# Patient Record
Sex: FEMALE | Race: WHITE | Employment: OTHER | URBAN - METROPOLITAN AREA
[De-identification: names, ages, dates, MRNs, and addresses within clinical notes are randomized per-mention and may not be internally consistent; named-entity substitution may affect disease eponyms.]

---

## 2017-11-21 ENCOUNTER — HOSPITAL ENCOUNTER (OUTPATIENT)
Dept: GENERAL RADIOLOGY | Age: 82
Discharge: HOME | End: 2017-11-21
Attending: INTERNAL MEDICINE
Payer: MEDICARE

## 2017-11-21 DIAGNOSIS — M25.552 LEFT HIP PAIN: ICD-10-CM

## 2017-11-21 PROCEDURE — 73502 X-RAY EXAM HIP UNI 2-3 VIEWS: CPT

## 2018-03-06 ENCOUNTER — OFFICE VISIT (OUTPATIENT)
Dept: FAMILY MEDICINE CLINIC | Age: 83
End: 2018-03-06

## 2018-03-06 VITALS
DIASTOLIC BLOOD PRESSURE: 89 MMHG | RESPIRATION RATE: 18 BRPM | WEIGHT: 171.6 LBS | HEART RATE: 70 BPM | OXYGEN SATURATION: 95 % | BODY MASS INDEX: 30.41 KG/M2 | TEMPERATURE: 98.6 F | HEIGHT: 63 IN | SYSTOLIC BLOOD PRESSURE: 158 MMHG

## 2018-03-06 DIAGNOSIS — E03.4 HYPOTHYROIDISM DUE TO ACQUIRED ATROPHY OF THYROID: ICD-10-CM

## 2018-03-06 DIAGNOSIS — R73.03 PRE-DIABETES: ICD-10-CM

## 2018-03-06 DIAGNOSIS — Z00.00 INITIAL MEDICARE ANNUAL WELLNESS VISIT: Primary | ICD-10-CM

## 2018-03-06 DIAGNOSIS — I10 ESSENTIAL HYPERTENSION: ICD-10-CM

## 2018-03-06 RX ORDER — AMLODIPINE AND BENAZEPRIL HYDROCHLORIDE 5; 20 MG/1; MG/1
1 CAPSULE ORAL DAILY
Qty: 90 CAP | Refills: 1 | Status: SHIPPED | OUTPATIENT
Start: 2018-03-06 | End: 2018-08-30 | Stop reason: SDUPTHER

## 2018-03-06 NOTE — PROGRESS NOTES
This is an Initial Medicare Annual Wellness Exam (AWV) (Performed 12 months after IPPE or effective date of Medicare Part B enrollment, Once in a lifetime)    I have reviewed the patient's medical history in detail and updated the computerized patient record. History     Past Medical History:   Diagnosis Date    Allergic rhinitis 3/27/2012    Angular blepharoconjunctivitis of both eyes 5/30/2012    Anxiety     Atypical chest pain 8/11/2011    Back pain     Cancer (HCC)     skin carcinoma excision    Constipation     Dermatochalasis     DJD (degenerative joint disease) 8/11/2011    Glucose intolerance (impaired glucose tolerance) 8/11/2011    Hepatitis     HTN (hypertension) 8/11/2011    HX OTHER MEDICAL 2009    Cardiac cath negative , in [place]    Hypothyroidism 8/11/2011    IBS (irritable bowel syndrome) 8/11/2011    Muscle pain     Onychomycosis     Osteopenia     PAC (premature atrial contraction)     Rectocele     Stress     Trouble in sleeping     Viral hepatitis     Weakness     Zoster       Past Surgical History:   Procedure Laterality Date    BREAST SURGERY PROCEDURE UNLISTED      rt breast bx neg.  ENDOSCOPY, COLON, DIAGNOSTIC  2010    diverticulosis, dr. Susan Solis, f/u     HX APPENDECTOMY      HX COLONOSCOPY      HX GYN      D and C, BTL, Hysterectomy('76)    HX OTHER SURGICAL      tonsilectomy    HX OTHER SURGICAL      eyelid sgy     HX TONSILLECTOMY      VASCULAR SURGERY PROCEDURE UNLIST      vein stripping     Current Outpatient Prescriptions   Medication Sig Dispense Refill    amLODIPine-benazepril (LOTREL) 5-10 mg per capsule TAKE 1 CAPSULE BY MOUTH DAILY 90 Cap 3    ALPRAZolam (XANAX) 0.5 mg tablet Take  by mouth nightly as needed for Anxiety.  calcium carbonate-vitamin D3 (CALCIUM 600 WITH VITAMIN D3) 600 mg(1,500mg) -500 unit cap Take  by mouth daily.  loratadine (CLARITIN) 10 mg tablet Take 10 mg by mouth.       levothyroxine (SYNTHROID) 125 mcg tablet TAKE 1 TABLET EVERY DAY 90 Tab 3    OTHER Glucosamine + MSM 1500 mg  , 2 daily. Psyllium Fiber  3 gm : 2 tabs daily      ibuprofen (MOTRIN) 200 mg tablet Take 200 mg by mouth every six (6) hours as needed.  OTHER Glucosamine Chondroitin 3tablets daily      ascorbic acid (VITAMIN C) 500 mg tablet Take  by mouth.  MAG HYDROX/AL HYDROX/SIMETH (ANTACID-SIMETHICONE PO) Take  by mouth.  fluocinolone acetonide oil 0.01 % Drop by Otic route.  OMEGA-3 FATTY ACIDS (FISH OIL CONCENTRATE PO) Take  by mouth.  aspirin delayed-release 81 mg tablet Take  by mouth daily.  MAGNESIUM OXIDE (MAG-OXIDE PO) Take 400 mg by mouth daily.  polyethylene glycol (MIRALAX) 17 gram/dose powder Take 17 g by mouth daily as needed.        Allergies   Allergen Reactions    Augmentin [Amoxicillin-Pot Clavulanate] Nausea and Vomiting    Bactrim [Sulfamethoprim Ds] Other (comments)     Palpitations, diarrhea    Ciprofloxacin Unknown (comments)    Codeine Unknown (comments)    Hydrocodone Unknown (comments)    Levaquin [Levofloxacin] Rash    Melatonin Other (comments)     nightmares    Morphine Unknown (comments)    Pataday [Olopatadine] Other (comments)     Eye itching      Family History   Problem Relation Age of Onset    Diabetes Father     Heart Attack Father     Hypertension Father     Stroke Mother     Heart Attack Mother      CABG    Hypertension Mother     Breast Cancer Sister     Diabetes Sister     Hypertension Sister     No Known Problems Brother     No Known Problems Maternal Grandmother     No Known Problems Maternal Grandfather     No Known Problems Paternal Grandmother     No Known Problems Paternal Grandfather     No Known Problems Other     Cancer Son      bladder cancer    Diabetes Son     Diabetes Sister     Hypertension Sister     Diabetes Sister     Hypertension Sister      Social History   Substance Use Topics    Smoking status: Never Smoker    Smokeless tobacco: Never Used    Alcohol use 2.4 oz/week     4 Glasses of wine per week     Patient Active Problem List   Diagnosis Code    Glucose intolerance (impaired glucose tolerance) R73.02    HTN (hypertension) I10    Atypical chest pain R07.89    IBS (irritable bowel syndrome) K58.9    Hypothyroidism E03.9    DJD (degenerative joint disease) M19.90    Allergic rhinitis J30.9    Angular blepharoconjunctivitis of both eyes H10.523    Rectocele N81.6       Depression Risk Factor Screening:     PHQ over the last two weeks 3/6/2018   Little interest or pleasure in doing things Not at all   Feeling down, depressed or hopeless Not at all   Total Score PHQ 2 0     Alcohol Risk Factor Screening: You do not drink alcohol or very rarely. Functional Ability and Level of Safety:     Hearing Loss  The patient wears hearing aids. Activities of Daily Living  The home contains: handrails and grab bars  Patient does total self care    Fall Risk  Fall Risk Assessment, last 12 mths 3/6/2018   Able to walk? Yes   Fall in past 12 months? Yes   Fall with injury? No   Number of falls in past 12 months 1   Fall Risk Score 1       Abuse Screen  Patient is not abused  Functional Ability:   Does the patient exhibit a steady gait? yes   How long did it take the patient to get up and walk from a sitting position? 3 seconds   Is the patient self reliant?  (ie can do own laundry, meals, household chores)  yes     Does the patient handle his/her own medications? yes     Does the patient handle his/her own money? yes     Is the patients home safe (ie good lighting, handrails on stairs and bath, etc.)? yes     Did you notice or did patient express any hearing difficulties? no     Did you notice or did patient express any vision difficulties?   no     Were distance and reading eye charts used?   no       Advance Care Planning:   Patient was offered the opportunity to discuss advance care planning:  yes     Does patient have an Advance Directive:  yes   If no, did you provide information on Caring Connections?   no     ADL Assessment 3/6/2018   Feeding yourself No Help Needed   Getting from bed to chair No Help Needed   Getting dressed No Help Needed   Bathing or showering No Help Needed   Walk across the room (includes cane/walker) No Help Needed   Using the telphone No Help Needed   Taking your medications No Help Needed   Preparing meals No Help Needed   Managing money (expenses/bills) No Help Needed   Moderately strenuous housework (laundry) No Help Needed   Shopping for personal items (toiletries/medicines) No Help Needed   Shopping for groceries No Help Needed   Driving No Help Needed   Climbing a flight of stairs No Help Needed   Getting to places beyond walking distances No Help Needed         Cognitive Screening   Evaluation of Cognitive Function:  Has your family/caregiver stated any concerns about your memory: no  Normal    Patient Care Team   Patient Care Team:  Graciella Carrel, MD as PCP - General (Family Practice)    Assessment/Plan   Education and counseling provided:  Are appropriate based on today's review and evaluation  End-of-Life planning (with patient's consent)  Pneumococcal Vaccine  Influenza Vaccine  Screening for glaucoma         Health Maintenance Due   Topic Date Due    GLAUCOMA SCREENING Q2Y  02/07/2016

## 2018-03-07 NOTE — ACP (ADVANCE CARE PLANNING)
Advance Care Planning (ACP) Provider Conversation Snapshot    Date of ACP Conversation: 03/06/18  Persons included in Conversation:  patient  Length of ACP Conversation in minutes:  <16 minutes (Non-Billable)    Authorized Decision Maker (if patient is incapable of making informed decisions): This person is:    Other Legally Authorized Decision Maker (e.g. Next of Kin)      Conversation Outcomes / Follow-Up Plan:   Recommended communicating the plan and making copies for the healthcare agent, personal physician, and others as appropriate (e.g., health system)  Recommended review of completed ACP document annually or upon change in health status

## 2018-03-07 NOTE — PROGRESS NOTES
HISTORY OF PRESENT ILLNESS  Zeina Pal is a 80 y.o. female. HPI  She is here as a new patient to become established. She and her  have moved to the area from [place]. They have been here several months and are getting acclimated to the area. She has a history of HTN and is on Lotrel. BPsat  home have been in the 130-150 range. She has not been exercising very much this winter. Tries to watch her diet and avoids sodium. Hypothyroid on Synthroid. .    Chronic allergies treated with Claritin. Last A1c was 6.2%    She takes a number of supplements including calcium, vitamin d and c, magnesium, fish oil, and glucosamine. Review of Systems   Constitutional: Positive for malaise/fatigue. Negative for chills and fever. HENT: Positive for congestion. Negative for sore throat. Respiratory: Negative for cough, shortness of breath and wheezing. Cardiovascular: Positive for leg swelling. Negative for chest pain and palpitations. Gastrointestinal: Negative for abdominal pain. Genitourinary: Negative for dysuria. Musculoskeletal: Positive for joint pain. Negative for myalgias. Skin: Positive for rash. Neurological: Negative for dizziness and headaches. Psychiatric/Behavioral: Negative for depression. The patient is not nervous/anxious.       Past Medical History:   Diagnosis Date    Allergic rhinitis 3/27/2012    Angular blepharoconjunctivitis of both eyes 5/30/2012    Anxiety     Atypical chest pain 8/11/2011    Back pain     Cancer ([facility] [address]     skin carcinoma excision    Constipation     Dermatochalasis     DJD (degenerative joint disease) 8/11/2011    Glucose intolerance (impaired glucose tolerance) 8/11/2011    Hepatitis     HTN (hypertension) 8/11/2011    HX OTHER MEDICAL 2009    Cardiac cath negative , in [place]    Hypothyroidism 8/11/2011    IBS (irritable bowel syndrome) 8/11/2011    Onychomycosis     Osteopenia     PAC (premature atrial contraction)     Rectocele     Stress     Viral hepatitis     Weakness     Zoster      Past Surgical History:   Procedure Laterality Date    BREAST SURGERY PROCEDURE UNLISTED      rt breast bx neg.  ENDOSCOPY, COLON, DIAGNOSTIC  2010    diverticulosis, dr. Lisa Bryant, f/u     HX APPENDECTOMY      HX COLONOSCOPY      HX GYN      D and C, BTL, Hysterectomy('76)    HX OTHER SURGICAL      tonsilectomy    HX OTHER SURGICAL      eyelid sgy     HX TONSILLECTOMY      VASCULAR SURGERY PROCEDURE UNLIST      vein stripping     Allergies   Allergen Reactions    Augmentin [Amoxicillin-Pot Clavulanate] Nausea and Vomiting    Bactrim [Sulfamethoprim Ds] Other (comments)     Palpitations, diarrhea    Ciprofloxacin Unknown (comments)    Codeine Unknown (comments)    Hydrocodone Unknown (comments)    Levaquin [Levofloxacin] Rash    Melatonin Other (comments)     nightmares    Morphine Unknown (comments)    Pataday [Olopatadine] Other (comments)     Eye itching      Blood pressure 158/89, pulse 70, temperature 98.6 °F (37 °C), temperature source Oral, resp. rate 18, height 5' 3\" (1.6 m), weight 171 lb 9.6 oz (77.8 kg), last menstrual period 05/27/1976, SpO2 95 %. Physical Exam   Constitutional: She appears well-developed and well-nourished. No distress. HENT:   Head: Normocephalic. Nose: Nose normal.   Mouth/Throat: Oropharynx is clear and moist.   Eyes: Conjunctivae are normal.   Neck: Neck supple. Cardiovascular: Normal rate, regular rhythm and normal heart sounds. Pulmonary/Chest: Effort normal and breath sounds normal. No respiratory distress. Lymphadenopathy:     She has no cervical adenopathy. Neurological: She is alert. Skin: Skin is warm. Psychiatric: She has a normal mood and affect. Vitals reviewed.       ASSESSMENT and PLAN  HTN/BMI 30   Increase Lotrel to 5/20mh daily   RTO 2 months for BP check and labs   Monitor BP at home and call if no improvement   Discussed diet, exercise, and weight loss  Hypothyroid   Continue Synthroid  Allergies   Continue Claritin  Pre diabetes   Last A1c was 6.2   Discussed diet and exercise   Advised 70% of pre diabetes develop DM

## 2018-05-07 ENCOUNTER — OFFICE VISIT (OUTPATIENT)
Dept: FAMILY MEDICINE CLINIC | Age: 83
End: 2018-05-07

## 2018-05-07 VITALS
WEIGHT: 171 LBS | HEART RATE: 72 BPM | BODY MASS INDEX: 30.3 KG/M2 | SYSTOLIC BLOOD PRESSURE: 164 MMHG | HEIGHT: 63 IN | RESPIRATION RATE: 18 BRPM | DIASTOLIC BLOOD PRESSURE: 89 MMHG | OXYGEN SATURATION: 97 % | TEMPERATURE: 98.5 F

## 2018-05-07 DIAGNOSIS — R73.03 PRE-DIABETES: ICD-10-CM

## 2018-05-07 DIAGNOSIS — I10 ESSENTIAL HYPERTENSION: ICD-10-CM

## 2018-05-07 DIAGNOSIS — R53.83 FATIGUE, UNSPECIFIED TYPE: ICD-10-CM

## 2018-05-07 DIAGNOSIS — E03.9 ACQUIRED HYPOTHYROIDISM: ICD-10-CM

## 2018-05-07 DIAGNOSIS — R60.9 EDEMA, UNSPECIFIED TYPE: Primary | ICD-10-CM

## 2018-05-07 NOTE — PROGRESS NOTES
1. Have you been to the ER, urgent care clinic since your last visit? Hospitalized since your last visit? No    2. Have you seen or consulted any other health care providers outside of the [address] [place] [address] since your last visit? Include any pap smears or colon screening.  No

## 2018-05-07 NOTE — PROGRESS NOTES
HISTORY OF PRESENT ILLNESS  Simon De Paz is a 80 y.o. female. HPI  She is here with swelling of her lower extremities. R>L. She had a previous injury to the right ankle requiring surgery. She is watching her sodium, exercising more regularly, and watching her diet. She is more fatigued. Wakes up tired. Having a hard time losing weight. Snores. BP has been elevated recently. Watching her sodium. [facility]. FH of CAD. Blood sugar has been mildly elevated. FH of DM    Review of Systems   Constitutional: Positive for malaise/fatigue. Negative for chills, fever and weight loss. HENT: Negative for congestion and sore throat. Respiratory: Negative for cough. Cardiovascular: Positive for leg swelling. Negative for chest pain and palpitations. Gastrointestinal: Negative for abdominal pain and nausea. Genitourinary: Negative for dysuria. Musculoskeletal: Positive for back pain, joint pain and myalgias. Neurological: Negative for dizziness and headaches. Psychiatric/Behavioral: Negative for depression. The patient does not have insomnia. Past Medical History:   Diagnosis Date    Allergic rhinitis 3/27/2012    Angular blepharoconjunctivitis of both eyes 5/30/2012    Anxiety     Atypical chest pain 8/11/2011    Back pain     Cancer (HCC)     skin carcinoma excision    Constipation     Dermatochalasis     DJD (degenerative joint disease) 8/11/2011    Glucose intolerance (impaired glucose tolerance) 8/11/2011    Hepatitis     HTN (hypertension) 8/11/2011    HX OTHER MEDICAL 2009    Cardiac cath negative , in [place]    Hypothyroidism 8/11/2011    IBS (irritable bowel syndrome) 8/11/2011    Onychomycosis     Osteopenia     PAC (premature atrial contraction)     Rectocele     Stress     Viral hepatitis     Weakness     Zoster      Past Surgical History:   Procedure Laterality Date    BREAST SURGERY PROCEDURE UNLISTED      rt breast bx neg.     ENDOSCOPY, COLON, DIAGNOSTIC  2010    diverticulosis, dr. Fidelia Valle, f/u     HX APPENDECTOMY      HX COLONOSCOPY      HX GYN      D and C, BTL, Hysterectomy('76)    HX OTHER SURGICAL      tonsilectomy    HX OTHER SURGICAL      eyelid sgy     HX TONSILLECTOMY      VASCULAR SURGERY PROCEDURE UNLIST      vein stripping       Allergies   Allergen Reactions    Augmentin [Amoxicillin-Pot Clavulanate] Nausea and Vomiting    Bactrim [Sulfamethoprim Ds] Other (comments)     Palpitations, diarrhea    Ciprofloxacin Unknown (comments)    Codeine Unknown (comments)    Hydrocodone Unknown (comments)    Levaquin [Levofloxacin] Rash    Melatonin Other (comments)     nightmares    Morphine Unknown (comments)    Pataday [Olopatadine] Other (comments)     Eye itching      Blood pressure 164/89, pulse 72, temperature 98.5 °F (36.9 °C), temperature source Oral, resp. rate 18, height 5' 3\" (1.6 m), weight 171 lb (77.6 kg), last menstrual period 05/27/1976, SpO2 97 %. Physical Exam   Constitutional: She appears well-developed and well-nourished. No distress. HENT:   Head: Normocephalic. Mouth/Throat: Oropharynx is clear and moist.   Neck: Neck supple. Cardiovascular: Normal rate, regular rhythm and normal heart sounds. Pulmonary/Chest: Effort normal and breath sounds normal. No respiratory distress. Musculoskeletal:   RLE edema. No calf swelling or cords. No redness. Lymphadenopathy:     She has no cervical adenopathy. Vitals reviewed.       ASSESSMENT and PLAN  Edema   Get doppler study today to r/o DVT- this was reported as neg  Fatigue    Check labs   Schedule home sleep study  HTN   Low sodium diet   Monitor BP  BMI 30   Discussed diet and exercise   Monitor weight  Hypothyroidism   Check TSH and adjust meds if needed  Pre diabetes   Discussed diet and exercise

## 2018-05-08 LAB
ALBUMIN SERPL-MCNC: 4 G/DL (ref 3.5–4.7)
ALBUMIN/GLOB SERPL: 1.9 {RATIO} (ref 1.2–2.2)
ALP SERPL-CCNC: 60 IU/L (ref 39–117)
ALT SERPL-CCNC: 18 IU/L (ref 0–32)
AST SERPL-CCNC: 22 IU/L (ref 0–40)
BASOPHILS # BLD AUTO: 0 X10E3/UL (ref 0–0.2)
BASOPHILS NFR BLD AUTO: 1 %
BILIRUB SERPL-MCNC: 0.3 MG/DL (ref 0–1.2)
BUN SERPL-MCNC: 13 MG/DL (ref 8–27)
BUN/CREAT SERPL: 21 (ref 12–28)
CALCIUM SERPL-MCNC: 9.2 MG/DL (ref 8.7–10.3)
CHLORIDE SERPL-SCNC: 95 MMOL/L (ref 96–106)
CO2 SERPL-SCNC: 26 MMOL/L (ref 18–29)
CREAT SERPL-MCNC: 0.63 MG/DL (ref 0.57–1)
EOSINOPHIL # BLD AUTO: 0.2 X10E3/UL (ref 0–0.4)
EOSINOPHIL NFR BLD AUTO: 3 %
ERYTHROCYTE [DISTWIDTH] IN BLOOD BY AUTOMATED COUNT: 13.6 % (ref 12.3–15.4)
GFR SERPLBLD CREATININE-BSD FMLA CKD-EPI: 83 ML/MIN/1.73
GFR SERPLBLD CREATININE-BSD FMLA CKD-EPI: 95 ML/MIN/1.73
GLOBULIN SER CALC-MCNC: 2.1 G/DL (ref 1.5–4.5)
GLUCOSE SERPL-MCNC: 153 MG/DL (ref 65–99)
HCT VFR BLD AUTO: 42 % (ref 34–46.6)
HGB BLD-MCNC: 14.2 G/DL (ref 11.1–15.9)
IMM GRANULOCYTES # BLD: 0 X10E3/UL (ref 0–0.1)
IMM GRANULOCYTES NFR BLD: 0 %
LYMPHOCYTES # BLD AUTO: 1.7 X10E3/UL (ref 0.7–3.1)
LYMPHOCYTES NFR BLD AUTO: 27 %
MCH RBC QN AUTO: 29.1 PG (ref 26.6–33)
MCHC RBC AUTO-ENTMCNC: 33.8 G/DL (ref 31.5–35.7)
MCV RBC AUTO: 86 FL (ref 79–97)
MONOCYTES # BLD AUTO: 0.7 X10E3/UL (ref 0.1–0.9)
MONOCYTES NFR BLD AUTO: 11 %
NEUTROPHILS # BLD AUTO: 3.7 X10E3/UL (ref 1.4–7)
NEUTROPHILS NFR BLD AUTO: 58 %
PLATELET # BLD AUTO: 242 X10E3/UL (ref 150–379)
POTASSIUM SERPL-SCNC: 3.6 MMOL/L (ref 3.5–5.2)
PROT SERPL-MCNC: 6.1 G/DL (ref 6–8.5)
RBC # BLD AUTO: 4.88 X10E6/UL (ref 3.77–5.28)
SODIUM SERPL-SCNC: 138 MMOL/L (ref 134–144)
TSH SERPL DL<=0.005 MIU/L-ACNC: 2.31 UIU/ML (ref 0.45–4.5)
WBC # BLD AUTO: 6.4 X10E3/UL (ref 3.4–10.8)

## 2018-05-08 NOTE — PROGRESS NOTES
Spoke with patient. Patient aware of results and states understanding at this time. Called lab sapna and added A1C to labs.

## 2018-05-09 LAB
HBA1C MFR BLD: 6.5 % (ref 4.8–5.6)
SPECIMEN STATUS REPORT, ROLRST: NORMAL

## 2018-05-10 NOTE — PROGRESS NOTES
Spoke with patient. Patient aware of results and states understanding at this time. Patient is scheduling an appt at this time for the next few weeks.

## 2018-05-22 ENCOUNTER — OFFICE VISIT (OUTPATIENT)
Dept: FAMILY MEDICINE CLINIC | Age: 83
End: 2018-05-22

## 2018-05-22 VITALS
WEIGHT: 171.13 LBS | DIASTOLIC BLOOD PRESSURE: 90 MMHG | SYSTOLIC BLOOD PRESSURE: 138 MMHG | BODY MASS INDEX: 30.32 KG/M2 | OXYGEN SATURATION: 97 % | HEART RATE: 102 BPM | HEIGHT: 63 IN | RESPIRATION RATE: 18 BRPM

## 2018-05-22 DIAGNOSIS — I10 ESSENTIAL HYPERTENSION: ICD-10-CM

## 2018-05-22 DIAGNOSIS — I48.0 PAROXYSMAL ATRIAL FIBRILLATION (HCC): ICD-10-CM

## 2018-05-22 DIAGNOSIS — R00.0 RAPID OR IRREGULAR HEARTBEAT: Primary | ICD-10-CM

## 2018-05-22 DIAGNOSIS — E03.9 ACQUIRED HYPOTHYROIDISM: ICD-10-CM

## 2018-05-22 NOTE — MR AVS SNAPSHOT
[address] [phone] Patient: Simon De Paz MRN: [id number] [facility]:0/50/[birth year]48 Visit Information Date & Time Provider Department Dept. Phone Encounter #  
 5/22/2018  3:00 PM Julian Guzmán  [address] [facility] [phone] [id number] Your Appointments 8/24/2018  2:30 PM  
ESTABLISHED PATIENT with MD Juliann Adams [address], [address] [place] ([facility]) Appt Note: physical; physical  
 [address] [facility] [phone]  
  
   
 [address] [facility] 7 [id number] Upcoming Health Maintenance Date Due  
 GLAUCOMA SCREENING Q2Y 2/7/2016 Influenza Age 5 to Adult 8/1/2018 MEDICARE YEARLY EXAM 3/7/2019 DTaP/Tdap/Td series (2 - Td) 3/6/2028 Allergies as of 5/22/2018  Review Complete On: 5/22/2018 By: Julian Guzmán MD  
  
 Severity Noted Reaction Type Reactions Augmentin [Amoxicillin-pot Clavulanate]  12/20/2011    Nausea and Vomiting Bactrim [Sulfamethoprim Ds]  03/04/2013    Other (comments) Palpitations, diarrhea Ciprofloxacin  01/16/2018    Unknown (comments) Codeine  08/11/2011    Unknown (comments) Hydrocodone  08/11/2011    Unknown (comments) Levaquin [Levofloxacin]  08/11/2011    Rash Melatonin  10/13/2011    Other (comments)  
 nightmares Morphine  08/11/2011    Unknown (comments) Pataday [Olopatadine]  03/27/2012    Other (comments) Eye itching Current Immunizations  Reviewed on 3/6/2018 Name Date Influenza High Dose Vaccine PF 10/30/2017 Influenza Vaccine 10/27/2014, 12/5/2013 Influenza Vaccine Split 11/29/2012 Pneumococcal Conjugate (PCV-13) 11/6/2017 Pneumococcal Vaccine (Unspecified Type) 11/11/1999 Td 3/5/2011, 4/7/1997 Not reviewed this visit You Were Diagnosed With   
  
 Codes Comments Rapid or irregular heartbeat    -  Primary ICD-10-CM: R00.0 ICD-9-CM: 785.0 Acquired hypothyroidism     ICD-10-CM: E03.9 ICD-9-CM: 244.9 Paroxysmal atrial fibrillation (HCC)     ICD-10-CM: I48.0 ICD-9-CM: 427.31 Essential hypertension     ICD-10-CM: I10 
ICD-9-CM: 401.9 Vitals BP Pulse Resp Height(growth percentile) Weight(growth percentile) LMP  
 138/90 (BP 1 Location: Left arm) (!) 102 18 5' 3\" (1.6 m) 171 lb 2 oz (77.6 kg) 05/27/1976 SpO2 BMI OB Status Smoking Status 97% 30.31 kg/m2 Hysterectomy Never Smoker Vitals History BMI and BSA Data Body Mass Index Body Surface Area  
 30.31 kg/m 2 1.86 m 2 Preferred Pharmacy Pharmacy Name Phone [id number], [address] [phone] Your Updated Medication List  
  
   
This list is accurate as of 5/22/18  4:17 PM.  Always use your most recent med list. amLODIPine-benazepril 5-20 mg per capsule Commonly known as:  Ellis Episcopalian Take 1 Cap by mouth daily. ANTACID-SIMETHICONE PO Take  by mouth. ascorbic acid (vitamin C) 500 mg tablet Commonly known as:  VITAMIN C Take  by mouth. aspirin delayed-release 81 mg tablet Take  by mouth daily. CALCIUM 600 WITH VITAMIN D3 600 mg(1,500mg) -500 unit Cap Generic drug:  calcium carbonate-vitamin D3 Take  by mouth daily. FISH OIL CONCENTRATE PO Take  by mouth. fluocinolone acetonide oil 0.01 % Drop  
by Otic route. levothyroxine 125 mcg tablet Commonly known as:  SYNTHROID  
TAKE 1 TABLET EVERY DAY  
  
 loratadine 10 mg tablet Commonly known as:  Cesario Found Take 10 mg by mouth. MAG-OXIDE PO Take 400 mg by mouth daily. MIRALAX 17 gram/dose powder Generic drug:  polyethylene glycol Take 17 g by mouth daily as needed. * OTHER Glucosamine Chondroitin 3tablets daily  * OTHER  
 Glucosamine + MSM 1500 mg  , 2 daily. Psyllium Fiber  3 gm : 2 tabs daily  
  
 rivaroxaban 20 mg Tab tablet Commonly known as:  Fidelia Quinones Take 1 Tab by mouth daily (with dinner). XANAX 0.5 mg tablet Generic drug:  ALPRAZolam  
Take  by mouth nightly as needed for Anxiety. * Notice: This list has 2 medication(s) that are the same as other medications prescribed for you. Read the directions carefully, and ask your doctor or other care provider to review them with you. Prescriptions Sent to Pharmacy Refills  
 rivaroxaban (XARELTO) 20 mg tab tablet 0 Sig: Take 1 Tab by mouth daily (with dinner). Class: Normal  
 Pharmacy: [id number] David Hurtado, [facility] [address] Ph #: [phone] Route: Oral  
  
We Performed the Following AMB POC EKG ROUTINE W/ 12 LEADS, INTER & REP [30211 CPT(R)] CBC WITH AUTOMATED DIFF [88320 CPT(R)] COLLECTION VENOUS BLOOD,VENIPUNCTURE [id number] CPT(R)] METABOLIC PANEL, COMPREHENSIVE [59121 CPT(R)] REFERRAL TO CARDIOLOGY [OQN73 Custom] TSH 3RD GENERATION [76639 CPT(R)] To-Do List   
 05/22/2018 ECHO:  2D ECHO COMPLETE ADULT (TTE) W OR WO CONTR   
  
 05/22/2018 ECG:  CARDIAC HOLTER MONITOR, 24 HOURS Referral Information Referral ID Referred By Referred To  
  
 [id number] Uvaldo COLBY MD   
   [address] [place] [address] Way Phone: [phone] Fax: [phone] Visits Status Start Date End Date 1 New Request 5/22/18 5/22/19 If your referral has a status of pending review or denied, additional information will be sent to support the outcome of this decision. Introducing [facility]! [employer] introduces [email or web address] patient portal. Now you can access parts of your medical record, email your doctor's office, and request medication refills online.    
 
1. In your internet browser, go to [email or web address] 2. Click on the First Time User? Click Here link in the Sign In box. You will see the New Member Sign Up page. 3. Enter your [employer] Access Code exactly as it appears below. You will not need to use this code after youve completed the sign-up process. If you do not sign up before the expiration date, you must request a new code. · [employer] Access Code: [email or web address] Expires: 8/5/2018  3:47 PM 
 
4. Enter the last four digits of your Social Security Number (xxxx) and Date of Birth (mm/dd/yyyy) as indicated and click Submit. You will be taken to the next sign-up page. 5. Create a [employer] ID. This will be your [employer] login ID and cannot be changed, so think of one that is secure and easy to remember. 6. Create a [employer] password. You can change your password at any time. 7. Enter your Password Reset Question and Answer. This can be used at a later time if you forget your password. 8. Enter your e-mail address. You will receive e-mail notification when new information is available in [address] 9. Click Sign Up. You can now view and download portions of your medical record. 10. Click the Download Summary menu link to download a portable copy of your medical information. If you have questions, please visit the Frequently Asked Questions section of the [employer] website. Remember, [employer] is NOT to be used for urgent needs. For medical emergencies, dial 911. Now available from your iPhone and Android! Please provide this summary of care documentation to your next provider. Your primary care clinician is listed as Rosa Maria Marcus. If you have any questions after today's visit, please call [phone].

## 2018-05-22 NOTE — PROGRESS NOTES
1. Have you been to the ER, urgent care clinic since your last visit? Hospitalized since your last visit? No    2. Have you seen or consulted any other health care providers outside of the [address] [place] [address] since your last visit? Include any pap smears or colon screening.  No

## 2018-05-22 NOTE — PROGRESS NOTES
HISTORY OF PRESENT ILLNESS  Karley Mae is a 80 y.o. female. HPI  She is here with c/o palpitations, edema and fatigue. Her  felt her pulse and found it was irregular. She denies CP, SOB, or dizziness. Review of Systems   Constitutional: Positive for malaise/fatigue. Negative for chills, fever and weight loss. HENT: Negative for congestion and sore throat. Respiratory: Negative for cough. Cardiovascular: Positive for palpitations and leg swelling. Negative for chest pain. Gastrointestinal: Negative for abdominal pain and nausea. Genitourinary: Negative for dysuria. Musculoskeletal: Positive for back pain, joint pain and myalgias. Negative for falls. Neurological: Negative for dizziness and headaches. Psychiatric/Behavioral: Negative for depression. The patient does not have insomnia. Past Medical History:   Diagnosis Date    Allergic rhinitis 3/27/2012    Angular blepharoconjunctivitis of both eyes 5/30/2012    Anxiety     Atypical chest pain 8/11/2011    Back pain     Cancer (HCC)     skin carcinoma excision    Constipation     Dermatochalasis     DJD (degenerative joint disease) 8/11/2011    Glucose intolerance (impaired glucose tolerance) 8/11/2011    Hepatitis     HTN (hypertension) 8/11/2011    HX OTHER MEDICAL 2009    Cardiac cath negative , in [place]    Hypothyroidism 8/11/2011    IBS (irritable bowel syndrome) 8/11/2011    Onychomycosis     Osteopenia     PAC (premature atrial contraction)     Rectocele     Stress     Viral hepatitis     Weakness     Zoster      Past Surgical History:   Procedure Laterality Date    BREAST SURGERY PROCEDURE UNLISTED      rt breast bx neg.     ENDOSCOPY, COLON, DIAGNOSTIC  2010    diverticulosis, dr. Joellen Osborne, f/u     HX APPENDECTOMY      HX COLONOSCOPY      HX GYN      D and C, BTL, Hysterectomy('76)    HX OTHER SURGICAL      tonsilectomy    HX OTHER SURGICAL      eyelid sgy     HX TONSILLECTOMY      VASCULAR SURGERY PROCEDURE UNLIST      vein stripping     Allergies   Allergen Reactions    Augmentin [Amoxicillin-Pot Clavulanate] Nausea and Vomiting    Bactrim [Sulfamethoprim Ds] Other (comments)     Palpitations, diarrhea    Ciprofloxacin Unknown (comments)    Codeine Unknown (comments)    Hydrocodone Unknown (comments)    Levaquin [Levofloxacin] Rash    Melatonin Other (comments)     nightmares    Morphine Unknown (comments)    Pataday [Olopatadine] Other (comments)     Eye itching      Blood pressure 138/90, pulse (!) 102, resp. rate 18, height 5' 3\" (1.6 m), weight 171 lb 2 oz (77.6 kg), last menstrual period 05/27/1976, SpO2 97 %. Physical Exam   Constitutional: She appears well-developed and well-nourished. No distress. HENT:   Head: Normocephalic. Mouth/Throat: Oropharynx is clear and moist.   Neck: Neck supple. Cardiovascular: Normal heart sounds. Irregular rate and rhythm   Pulmonary/Chest: Effort normal and breath sounds normal. No respiratory distress. Musculoskeletal:   RLE edema. No calf swelling or cords. No redness. Lymphadenopathy:     She has no cervical adenopathy. Vitals reviewed.     EKG: A-fib with occ ectopic beat, rate 111  ASSESSMENT and PLAN  New onset A-fib   Schedule 24 hour holter and ECHO   Start Xarelto 20mg daily    Schedule appt with Dr Cervantes Route labs   To ER if worsening symptoms, dizziness or lightheadedness, or CP  HTN   Continue meds   Low sodium diet  Hypothyroidism   Adjust meds if needed

## 2018-05-23 LAB
ALBUMIN SERPL-MCNC: 4.2 G/DL (ref 3.5–4.7)
ALBUMIN/GLOB SERPL: 2.2 {RATIO} (ref 1.2–2.2)
ALP SERPL-CCNC: 59 IU/L (ref 39–117)
ALT SERPL-CCNC: 18 IU/L (ref 0–32)
AST SERPL-CCNC: 21 IU/L (ref 0–40)
BASOPHILS # BLD AUTO: 0 X10E3/UL (ref 0–0.2)
BASOPHILS NFR BLD AUTO: 0 %
BILIRUB SERPL-MCNC: 0.4 MG/DL (ref 0–1.2)
BUN SERPL-MCNC: 15 MG/DL (ref 8–27)
BUN/CREAT SERPL: 19 (ref 12–28)
CALCIUM SERPL-MCNC: 9 MG/DL (ref 8.7–10.3)
CHLORIDE SERPL-SCNC: 100 MMOL/L (ref 96–106)
CO2 SERPL-SCNC: 27 MMOL/L (ref 18–29)
CREAT SERPL-MCNC: 0.81 MG/DL (ref 0.57–1)
EOSINOPHIL # BLD AUTO: 0.2 X10E3/UL (ref 0–0.4)
EOSINOPHIL NFR BLD AUTO: 3 %
ERYTHROCYTE [DISTWIDTH] IN BLOOD BY AUTOMATED COUNT: 13.3 % (ref 12.3–15.4)
GFR SERPLBLD CREATININE-BSD FMLA CKD-EPI: 67 ML/MIN/1.73
GFR SERPLBLD CREATININE-BSD FMLA CKD-EPI: 77 ML/MIN/1.73
GLOBULIN SER CALC-MCNC: 1.9 G/DL (ref 1.5–4.5)
GLUCOSE SERPL-MCNC: 135 MG/DL (ref 65–99)
HCT VFR BLD AUTO: 42 % (ref 34–46.6)
HGB BLD-MCNC: 14 G/DL (ref 11.1–15.9)
IMM GRANULOCYTES # BLD: 0 X10E3/UL (ref 0–0.1)
IMM GRANULOCYTES NFR BLD: 0 %
LYMPHOCYTES # BLD AUTO: 2.3 X10E3/UL (ref 0.7–3.1)
LYMPHOCYTES NFR BLD AUTO: 33 %
MCH RBC QN AUTO: 29.2 PG (ref 26.6–33)
MCHC RBC AUTO-ENTMCNC: 33.3 G/DL (ref 31.5–35.7)
MCV RBC AUTO: 88 FL (ref 79–97)
MONOCYTES # BLD AUTO: 0.6 X10E3/UL (ref 0.1–0.9)
MONOCYTES NFR BLD AUTO: 8 %
NEUTROPHILS # BLD AUTO: 3.7 X10E3/UL (ref 1.4–7)
NEUTROPHILS NFR BLD AUTO: 56 %
PLATELET # BLD AUTO: 263 X10E3/UL (ref 150–379)
POTASSIUM SERPL-SCNC: 3.7 MMOL/L (ref 3.5–5.2)
PROT SERPL-MCNC: 6.1 G/DL (ref 6–8.5)
RBC # BLD AUTO: 4.8 X10E6/UL (ref 3.77–5.28)
SODIUM SERPL-SCNC: 139 MMOL/L (ref 134–144)
TSH SERPL DL<=0.005 MIU/L-ACNC: 2.38 UIU/ML (ref 0.45–4.5)
WBC # BLD AUTO: 6.8 X10E3/UL (ref 3.4–10.8)

## 2018-05-25 ENCOUNTER — OFFICE VISIT (OUTPATIENT)
Dept: FAMILY MEDICINE CLINIC | Age: 83
End: 2018-05-25

## 2018-05-25 VITALS
SYSTOLIC BLOOD PRESSURE: 122 MMHG | RESPIRATION RATE: 18 BRPM | OXYGEN SATURATION: 97 % | DIASTOLIC BLOOD PRESSURE: 82 MMHG | WEIGHT: 168 LBS | TEMPERATURE: 98.6 F | HEIGHT: 63 IN | BODY MASS INDEX: 29.77 KG/M2 | HEART RATE: 100 BPM

## 2018-05-25 DIAGNOSIS — I48.19 PERSISTENT ATRIAL FIBRILLATION (HCC): Primary | ICD-10-CM

## 2018-05-25 DIAGNOSIS — F43.0 ANXIETY AS ACUTE REACTION TO EXCEPTIONAL STRESS: ICD-10-CM

## 2018-05-25 DIAGNOSIS — I49.9 IRREGULAR HEART BEAT: ICD-10-CM

## 2018-05-25 DIAGNOSIS — F41.1 ANXIETY AS ACUTE REACTION TO EXCEPTIONAL STRESS: ICD-10-CM

## 2018-05-25 RX ORDER — METOPROLOL SUCCINATE 50 MG/1
50 TABLET, EXTENDED RELEASE ORAL 2 TIMES DAILY
Qty: 60 TAB | Refills: 2 | Status: SHIPPED | OUTPATIENT
Start: 2018-05-25 | End: 2018-08-18 | Stop reason: SDUPTHER

## 2018-05-25 NOTE — MR AVS SNAPSHOT
[facility] 
 
 
 [address] [facility] [phone] Patient: Herbert Stiles MRN: [id number] Visit Information Date & Time Provider Department Dept. Phone Encounter #  
 5/25/2018 10:40 AM Geraldene Epley., MD  Andrew Osorio [id number] Your Appointments 5/29/2018  4:15 PM  
PROCEDURE with [facility] ([address]) Appt Note: holter per dr. Syeda Michaels $0cp [address] [facility] 67 [address] [phone]  
  
   
 [address] [address] 6/25/2018  3:20 PM  
New Patient with Sandie Thrasher MD  
[facility] [address]) Appt Note: ref f-w - fu holter results - echo being ordered [address] [facility] 67 [address] [phone]  
  
   
 [address] [address] 8/24/2018  2:30 PM  
ESTABLISHED PATIENT with MD hTomas LopezAureliano [facility] [address], [address] ([address] [place] [address]) Appt Note: physical; physical  
 [address] [facility] 7 [address]  
[phone]  
  
   
 [address] [facility] 7 [address] Upcoming Health Maintenance Date Due  
 GLAUCOMA SCREENING Q2Y 2/7/2016 Influenza Age 5 to Adult 8/1/2018 MEDICARE YEARLY EXAM 3/7/2019 DTaP/Tdap/Td series (2 - Td) 3/6/2028 Allergies as of 5/25/2018  Review Complete On: 5/25/2018 By: Geraldene Epley., MD  
  
 Severity Noted Reaction Type Reactions Augmentin [Amoxicillin-pot Clavulanate]  12/20/2011    Nausea and Vomiting Bactrim [Sulfamethoprim Ds]  03/04/2013    Other (comments) Palpitations, diarrhea Ciprofloxacin  01/16/2018    Unknown (comments) Codeine  08/11/2011    Unknown (comments) Hydrocodone  08/11/2011    Unknown (comments) Levaquin [Levofloxacin]  08/11/2011    Rash Melatonin  10/13/2011    Other (comments)  
 nightmares Morphine  08/11/2011    Unknown (comments) Pataday [Olopatadine]  03/27/2012    Other (comments) Eye itching Current Immunizations  Reviewed on 3/6/2018 Name Date Influenza High Dose Vaccine PF 10/30/2017 Influenza Vaccine 10/27/2014, 12/5/2013 Influenza Vaccine Split 11/29/2012 Pneumococcal Conjugate (PCV-13) 11/6/2017 Pneumococcal Vaccine (Unspecified Type) 11/11/1999 Td 3/5/2011, 4/7/1997 Not reviewed this visit You Were Diagnosed With   
  
 Codes Comments Irregular heart beat    -  Primary ICD-10-CM: I49.9 ICD-9-CM: 427.9 Vitals BP Pulse Temp Resp Height(growth percentile) Weight(growth percentile) 122/82 (BP 1 Location: Left arm, BP Patient Position: Sitting) 100 98.6 °F (37 °C) (Oral) 18 5' 3\" (1.6 m) 168 lb (76.2 kg) LMP SpO2 BMI OB Status Smoking Status 05/27/1976 97% 29.76 kg/m2 Hysterectomy Never Smoker Vitals History BMI and BSA Data Body Mass Index Body Surface Area  
 29.76 kg/m 2 1.84 m 2 Preferred Pharmacy Pharmacy Name Phone [id number] [address] [phone] Your Updated Medication List  
  
   
This list is accurate as of 5/25/18 12:00 PM.  Always use your most recent med list. amLODIPine-benazepril 5-20 mg per capsule Commonly known as:  Elmo Sill Take 1 Cap by mouth daily. ANTACID-SIMETHICONE PO Take  by mouth. ascorbic acid (vitamin C) 500 mg tablet Commonly known as:  VITAMIN C Take  by mouth. aspirin delayed-release 81 mg tablet Take  by mouth daily. CALCIUM 600 WITH VITAMIN D3 600 mg(1,500mg) -500 unit Cap Generic drug:  calcium carbonate-vitamin D3 Take  by mouth daily. FISH OIL CONCENTRATE PO Take  by mouth. fluocinolone acetonide oil 0.01 % Drop  
by Otic route. levothyroxine 125 mcg tablet Commonly known as:  SYNTHROID  
TAKE 1 TABLET EVERY DAY  
  
 loratadine 10 mg tablet Commonly known as:  Bernradino Bird Take 10 mg by mouth. MAG-OXIDE PO Take 400 mg by mouth daily. MIRALAX 17 gram/dose powder Generic drug:  polyethylene glycol Take 17 g by mouth daily as needed. * OTHER Glucosamine Chondroitin 3tablets daily * OTHER Glucosamine + MSM 1500 mg  , 2 daily. Psyllium Fiber  3 gm : 2 tabs daily  
  
 rivaroxaban 20 mg Tab tablet Commonly known as:  Jossy Benes Take 1 Tab by mouth daily (with dinner). XANAX 0.5 mg tablet Generic drug:  ALPRAZolam  
Take  by mouth nightly as needed for Anxiety. * Notice: This list has 2 medication(s) that are the same as other medications prescribed for you. Read the directions carefully, and ask your doctor or other care provider to review them with you. We Performed the Following AMB POC EKG ROUTINE W/ 12 LEADS, INTER & REP [09235 CPT(R)] To-Do List   
 05/31/2018 2:00 PM  
  Appointment with Aldo Bueno 2 at [place] [address] ([phone]) GENERAL INSTRUCTIONS - If you have a hand-written prescription for this exam, you must bring it with you on the day of your exam. - Bring all relevant prior images from facilities outside of [facility]. Failure to provide images may delay reading by Radiologist. - Children under the age of 15 may not be left unattended. - [address] [place] [address] patients must have an armband and be accompanied by a caregiver or family member. APPOINTMENT CANCELLATION - To reschedule or cancel an appointment, please contact the Scheduling Department at [phone]. Introducing \[facility]\"" & HEALTH SERVICES! [facility] introduces [employer] patient portal. Now you can access parts of your medical record, email your doctor's office, and request medication refills online.    
 
1. In your internet browser, go to [email or web address] 2. Click on the First Time User? Click Here link in the Sign In box. You will see the New Member Sign Up page. 3. Enter your Stem Access Code exactly as it appears below. You will not need to use this code after youve completed the sign-up process. If you do not sign up before the expiration date, you must request a new code. · Stem Access Code: [email or web address] Expires: 8/5/2018  3:47 PM 
 
4. Enter the last four digits of your Social Security Number (xxxx) and Date of Birth (mm/dd/yyyy) as indicated and click Submit. You will be taken to the next sign-up page. 5. Create a [employer] ID. This will be your Stem login ID and cannot be changed, so think of one that is secure and easy to remember. 6. Create a Stem password. You can change your password at any time. 7. Enter your Password Reset Question and Answer. This can be used at a later time if you forget your password. 8. Enter your e-mail address. You will receive e-mail notification when new information is available in [address] 9. Click Sign Up. You can now view and download portions of your medical record. 10. Click the Download Summary menu link to download a portable copy of your medical information. If you have questions, please visit the Frequently Asked Questions section of the Stem website. Remember, Stem is NOT to be used for urgent needs. For medical emergencies, dial 911. Now available from your iPhone and Android! Please provide this summary of care documentation to your next provider. Your primary care clinician is listed as Wilfredo Lorenzo. If you have any questions after today's visit, please call [phone].

## 2018-05-25 NOTE — PROGRESS NOTES
1. Have you been to the ER, urgent care clinic since your last visit? Hospitalized since your last visit? No    2. Have you seen or consulted any other health care providers outside of the [address] [place] [address] since your last visit? Include any pap smears or colon screening.  No

## 2018-05-25 NOTE — PROGRESS NOTES
Molly Camilo is a 80 y.o. female who presents with the following:  Chief Complaint   Patient presents with    Nausea     last night    Diarrhea     4 times    Irregular Heart Beat     Started xarelto on tuesday    Head Pain     behind left ear        Nausea    The history is provided by the patient (Patient has had nausea with diarrhea for several days without fevers chills or sweats nor blood in stool nor pus.). Associated symptoms include diarrhea. Pertinent negatives include no chills, no fever, no abdominal pain, no headaches, no myalgias, no cough and no headaches. Diarrhea    The history is provided by the patient (Patient has had diarrhea as noted above. ). Pertinent negatives include no abdominal pain, no chills, no headaches, no myalgias and no cough. Irregular Heart Beat    The history is provided by the patient (Patient has been extremely anxious over developing A. fib and being started on Xarelto. She now has tachycardia. ). Associated symptoms include nausea. Pertinent negatives include no fever, no malaise/fatigue, no chest pain, no claudication, no orthopnea, no abdominal pain, no headaches, no dizziness, no cough and no shortness of breath. Head Pain    The history is provided by the patient (The patient has had one episode that was very brief of pain in the left mastoid area that radiated down her sternocleidomastoid muscle and was enough to wake her up but went away and a few seconds and has not recurred. ). Associated symptoms include palpitations and nausea. Pertinent negatives include no fever, no malaise/fatigue, no orthopnea, no shortness of breath, no tingling and no dizziness.        Allergies   Allergen Reactions    Augmentin [Amoxicillin-Pot Clavulanate] Nausea and Vomiting    Bactrim [Sulfamethoprim Ds] Other (comments)     Palpitations, diarrhea    Ciprofloxacin Unknown (comments)    Codeine Unknown (comments)    Hydrocodone Unknown (comments)    Levaquin [Levofloxacin] Rash    Melatonin Other (comments)     nightmares    Morphine Unknown (comments)    Pataday [Olopatadine] Other (comments)     Eye itching        Current Outpatient Prescriptions   Medication Sig    metoprolol succinate (TOPROL-XL) 50 mg XL tablet Take 1 Tab by mouth two (2) times a day.  rivaroxaban (XARELTO) 20 mg tab tablet Take 1 Tab by mouth daily (with dinner).  amLODIPine-benazepril (LOTREL) 5-20 mg per capsule Take 1 Cap by mouth daily.  ALPRAZolam (XANAX) 0.5 mg tablet Take  by mouth nightly as needed for Anxiety.  calcium carbonate-vitamin D3 (CALCIUM 600 WITH VITAMIN D3) 600 mg(1,500mg) -500 unit cap Take  by mouth daily.  loratadine (CLARITIN) 10 mg tablet Take 10 mg by mouth.  levothyroxine (SYNTHROID) 125 mcg tablet TAKE 1 TABLET EVERY DAY    OTHER Glucosamine + MSM 1500 mg  , 2 daily. Psyllium Fiber  3 gm : 2 tabs daily    OTHER Glucosamine Chondroitin 3tablets daily    ascorbic acid (VITAMIN C) 500 mg tablet Take  by mouth.  MAG HYDROX/AL HYDROX/SIMETH (ANTACID-SIMETHICONE PO) Take  by mouth.  fluocinolone acetonide oil 0.01 % Drop by Otic route.  OMEGA-3 FATTY ACIDS (FISH OIL CONCENTRATE PO) Take  by mouth.  aspirin delayed-release 81 mg tablet Take  by mouth daily.  MAGNESIUM OXIDE (MAG-OXIDE PO) Take 400 mg by mouth daily.  polyethylene glycol (MIRALAX) 17 gram/dose powder Take 17 g by mouth daily as needed. No current facility-administered medications for this visit.         Past Medical History:   Diagnosis Date    Allergic rhinitis 3/27/2012    Angular blepharoconjunctivitis of both eyes 5/30/2012    Anxiety     Atypical chest pain 8/11/2011    Back pain     Cancer (HCC)     skin carcinoma excision    Constipation     Dermatochalasis     DJD (degenerative joint disease) 8/11/2011    Glucose intolerance (impaired glucose tolerance) 8/11/2011    Hepatitis     HTN (hypertension) 8/11/2011    [address] [place] [address] 2009 Cardiac cath negative , in [place] Hypothyroidism 8/11/2011    IBS (irritable bowel syndrome) 8/11/2011    Onychomycosis     Osteopenia     PAC (premature atrial contraction)     Rectocele     Stress     Viral hepatitis     Weakness     Zoster        Past Surgical History:   Procedure Laterality Date    BREAST SURGERY PROCEDURE UNLISTED      rt breast bx neg.  ENDOSCOPY, COLON, DIAGNOSTIC  2010    diverticulosis, dr. Laura Rucker, f/u     HX APPENDECTOMY      HX COLONOSCOPY      HX GYN      D and C, BTL, Hysterectomy('76)    HX OTHER SURGICAL      tonsilectomy    HX OTHER SURGICAL      eyelid sgy     HX TONSILLECTOMY      VASCULAR SURGERY PROCEDURE UNLIST      vein stripping       Family History   Problem Relation Age of Onset    Diabetes Father     Heart Attack Father     Hypertension Father     Stroke Mother     Heart Attack Mother      CABG    Hypertension Mother     Breast Cancer Sister     Diabetes Sister     Hypertension Sister     No Known Problems Brother     No Known Problems Maternal Grandmother     No Known Problems Maternal Grandfather     No Known Problems Paternal Grandmother     No Known Problems Paternal Grandfather     No Known Problems Other     Cancer Son      bladder cancer    Diabetes Son     Diabetes Sister     Hypertension Sister     Diabetes Sister     Hypertension Sister     Heart Attack Sister        Social History     Social History    Marital status:      Spouse name: Lon Wiggins Number of children: 10    Years of education: N/A     Social History Main Topics    Smoking status: Never Smoker    Smokeless tobacco: Never Used    Alcohol use 2.4 oz/week     4 Glasses of wine per week    Drug use: No    Sexual activity: No     Other Topics Concern    None     Social History Narrative       Review of Systems   Constitutional: Negative for chills, fever, malaise/fatigue and weight loss.    HENT: Negative for congestion, hearing loss, sore throat and tinnitus. Eyes: Negative for blurred vision, pain and discharge. Respiratory: Negative for cough, shortness of breath and wheezing. Cardiovascular: Positive for palpitations. Negative for chest pain, orthopnea, claudication and leg swelling. Gastrointestinal: Positive for diarrhea and nausea. Negative for abdominal pain, constipation and heartburn. Genitourinary: Negative for dysuria, frequency and urgency. Musculoskeletal: Positive for neck pain. Negative for falls, joint pain and myalgias. Skin: Negative for itching and rash. Neurological: Negative for dizziness, tingling, tremors and headaches. Endo/Heme/Allergies: Negative for environmental allergies and polydipsia. Psychiatric/Behavioral: Negative for depression and substance abuse. The patient is not nervous/anxious. Visit Vitals    /82 (BP 1 Location: Left arm, BP Patient Position: Sitting)    Pulse 100    Temp 98.6 °F (37 °C) (Oral)    Resp 18    Ht 5' 3\" (1.6 m)    Wt 168 lb (76.2 kg)    LMP 05/27/1976    SpO2 97%    BMI 29.76 kg/m2     Physical Exam   Constitutional: She is oriented to person, place, and time and well-developed, well-nourished, and in no distress. HENT:   Head: Normocephalic and atraumatic. Right Ear: External ear normal.   Left Ear: External ear normal.   Mouth/Throat: Oropharynx is clear and moist.   Eyes: Conjunctivae and EOM are normal. Pupils are equal, round, and reactive to light. Right eye exhibits no discharge. Left eye exhibits no discharge. Neck: Normal range of motion. Neck supple. No tracheal deviation present. No thyromegaly present. Cardiovascular: Normal heart sounds and intact distal pulses. Exam reveals no gallop and no friction rub. No murmur heard. Heart rate was 115 and it was irregularly irregular but there were no ST changes noted on the EKG. Pulmonary/Chest: Effort normal and breath sounds normal. No respiratory distress. She has no wheezes.  She exhibits no tenderness. Abdominal: Soft. Bowel sounds are normal. She exhibits no distension and no mass. There is no tenderness. There is no rebound and no guarding. Musculoskeletal: She exhibits no edema or tenderness. Lymphadenopathy:     She has no cervical adenopathy. Neurological: She is alert and oriented to person, place, and time. She has normal reflexes. No cranial nerve deficit. She exhibits normal muscle tone. Gait normal. Coordination normal.   Skin: Skin is warm and dry. No rash noted. No erythema. No pallor. Psychiatric: Mood, memory, affect and judgment normal.         ICD-10-CM ICD-9-CM    1. Persistent atrial fibrillation (HCC) I48.1 427.31 metoprolol succinate (TOPROL-XL) 50 mg XL tablet   2. Irregular heart beat I49.9 427.9 AMB POC EKG ROUTINE W/ 12 LEADS, INTER & REP   3. Anxiety as acute reaction to exceptional stress F41.1 308.0 metoprolol succinate (TOPROL-XL) 50 mg XL tablet    F43.0         Orders Placed This Encounter    AMB POC EKG ROUTINE W/ 12 LEADS, INTER & REP     Order Specific Question:   Reason for Exam:     Answer:   irregular heart beat    metoprolol succinate (TOPROL-XL) 50 mg XL tablet     Sig: Take 1 Tab by mouth two (2) times a day.      Dispense:  60 Tab     Refill:  2       Follow-up Disposition: Not on Alana Hernandez MD

## 2018-05-29 ENCOUNTER — OFFICE VISIT (OUTPATIENT)
Dept: CARDIOLOGY CLINIC | Age: 83
End: 2018-05-29

## 2018-05-29 DIAGNOSIS — I48.0 PAROXYSMAL ATRIAL FIBRILLATION (HCC): ICD-10-CM

## 2018-05-31 ENCOUNTER — DOCUMENTATION ONLY (OUTPATIENT)
Dept: CARDIOLOGY CLINIC | Age: 83
End: 2018-05-31

## 2018-06-12 ENCOUNTER — TELEPHONE (OUTPATIENT)
Dept: FAMILY MEDICINE CLINIC | Age: 83
End: 2018-06-12

## 2018-06-12 NOTE — TELEPHONE ENCOUNTER
Patient is moving back to [place] due to not being near family just wanted to thank you for the care given.

## 2018-06-12 NOTE — TELEPHONE ENCOUNTER
Pt called requesting call back to speak with SUSANNAH. She states a lot of things have changed in her life and she is really wanting to speak with  For advice.  She asked for call back at [phone]

## 2018-06-25 ENCOUNTER — OFFICE VISIT (OUTPATIENT)
Dept: CARDIOLOGY CLINIC | Age: 83
End: 2018-06-25

## 2018-06-25 VITALS
RESPIRATION RATE: 18 BRPM | OXYGEN SATURATION: 97 % | BODY MASS INDEX: 30.3 KG/M2 | HEIGHT: 63 IN | HEART RATE: 86 BPM | SYSTOLIC BLOOD PRESSURE: 130 MMHG | WEIGHT: 171 LBS | DIASTOLIC BLOOD PRESSURE: 92 MMHG

## 2018-06-25 DIAGNOSIS — I48.19 PERSISTENT ATRIAL FIBRILLATION (HCC): Primary | ICD-10-CM

## 2018-06-25 DIAGNOSIS — I34.0 NON-RHEUMATIC MITRAL REGURGITATION: ICD-10-CM

## 2018-06-25 DIAGNOSIS — E03.9 ACQUIRED HYPOTHYROIDISM: ICD-10-CM

## 2018-06-25 DIAGNOSIS — I10 ESSENTIAL HYPERTENSION: ICD-10-CM

## 2018-06-25 DIAGNOSIS — R73.03 PRE-DIABETES: ICD-10-CM

## 2018-06-25 DIAGNOSIS — M15.9 PRIMARY OSTEOARTHRITIS INVOLVING MULTIPLE JOINTS: ICD-10-CM

## 2018-06-25 NOTE — PROGRESS NOTES
Alyssia Peralta is a 80 y.o. female is here for cardiac evaluation, recent onset afib, seen by PCP 5/22/18. Hx hypertension, pre-DM, mild obesity, Holter 5/25/18 with afib troughout, HR , occ PVC's. Echo 5/31/18 with mild LVH, LVEF 55-60, mod-sev LAE, post MV flail with mod MR, MAC, AV sclerosis/no stenosis, mild pulm hypertension. Is on Xarelto 20 and metoprolol succ 50mg bid, lotrel. CHADs2-VASC 4-5. Sx of fatigue, DURAND, \"quesy\" after taking Xarelto. . The patient denies chest pain, orthopnea, PND, LE edema, palpitations, syncope, presyncope or .        Patient Active Problem List    Diagnosis Date Noted    Persistent atrial fibrillation ([facility] [address] 06/25/2018    Non-rheumatic mitral regurgitation 06/25/2018    Essential hypertension 05/07/2018    Acquired hypothyroidism 05/07/2018    Pre-diabetes 03/06/2018    BMI 30.0-30.9,adult 03/06/2018    Rectocele     Angular blepharoconjunctivitis of both eyes 05/30/2012    Allergic rhinitis 03/27/2012    Atypical chest pain 08/11/2011    IBS (irritable bowel syndrome) 08/11/2011    DJD (degenerative joint disease) 08/11/2011      Rl Fay MD  Past Medical History:   Diagnosis Date    A-fib [facility])     Allergic rhinitis 3/27/2012    Angular blepharoconjunctivitis of both eyes 5/30/2012    Anxiety     Atypical chest pain 8/11/2011    Back pain     Cancer ([facility] [address]     skin carcinoma excision    Constipation     Dermatochalasis     DJD (degenerative joint disease) 8/11/2011    Glucose intolerance (impaired glucose tolerance) 8/11/2011    Hepatitis     HTN (hypertension) 8/11/2011    HX OTHER MEDICAL 2009    Cardiac cath negative , in [place] Hypothyroidism 8/11/2011    IBS (irritable bowel syndrome) 8/11/2011    Mitral regurgitation     Onychomycosis     Osteopenia     PAC (premature atrial contraction)     Rectocele     Stress     Viral hepatitis     Weakness     Zoster       Past Surgical History:   Procedure Laterality Date    BREAST SURGERY PROCEDURE UNLISTED      rt breast bx neg.  ENDOSCOPY, COLON, DIAGNOSTIC  2010    diverticulosis, dr. Lorena Allison, f/u     HX APPENDECTOMY      HX COLONOSCOPY      HX GYN      D and C, BTL, Hysterectomy('76)    HX OTHER SURGICAL      tonsilectomy    HX OTHER SURGICAL      eyelid sgy     HX TONSILLECTOMY      VASCULAR SURGERY PROCEDURE UNLIST      vein stripping     Allergies   Allergen Reactions    Augmentin [Amoxicillin-Pot Clavulanate] Nausea and Vomiting    Bactrim [Sulfamethoprim Ds] Other (comments)     Palpitations, diarrhea    Ciprofloxacin Unknown (comments)    Codeine Unknown (comments)    Hydrocodone Unknown (comments)    Levaquin [Levofloxacin] Rash    Melatonin Other (comments)     nightmares    Morphine Unknown (comments)    Pataday [Olopatadine] Other (comments)     Eye itching       Family History   Problem Relation Age of Onset    Diabetes Father     Heart Attack Father     Hypertension Father     Stroke Mother     Heart Attack Mother      CABG    Hypertension Mother     Breast Cancer Sister     Diabetes Sister     Hypertension Sister     No Known Problems Brother     No Known Problems Maternal Grandmother     No Known Problems Maternal Grandfather     No Known Problems Paternal Grandmother     No Known Problems Paternal Grandfather     No Known Problems Other     Cancer Son      bladder cancer    Diabetes Son     Diabetes Sister     Hypertension Sister     Diabetes Sister     Hypertension Sister     Heart Attack Sister       Social History     Social History    Marital status:      Spouse name: Alexa Bean Number of children: 10    Years of education: N/A     Occupational History    Not on file.      Social History Main Topics    Smoking status: Never Smoker    Smokeless tobacco: Never Used    Alcohol use 2.4 oz/week     4 Glasses of wine per week    Drug use: No    Sexual activity: No     Other Topics Concern    Not on file     Social History Narrative      Current Outpatient Prescriptions   Medication Sig    calcium carbonate/vitamin D3 (CALTRATE WITH VITAMIN D3 PO) Take 600 mg by mouth.  MAGNESIUM PO Take  by mouth.  LORATADINE PO Take  by mouth.  metoprolol succinate (TOPROL-XL) 50 mg XL tablet Take 1 Tab by mouth two (2) times a day.  rivaroxaban (XARELTO) 20 mg tab tablet Take 1 Tab by mouth daily (with dinner).  amLODIPine-benazepril (LOTREL) 5-20 mg per capsule Take 1 Cap by mouth daily.  ALPRAZolam (XANAX) 0.5 mg tablet Take  by mouth nightly as needed for Anxiety.  levothyroxine (SYNTHROID) 125 mcg tablet TAKE 1 TABLET EVERY DAY    OTHER Glucosamine Chondroitin 3tablets daily    ascorbic acid (VITAMIN C) 500 mg tablet Take  by mouth.  OTHER Glucosamine + MSM 1500 mg  , 2 daily. Psyllium Fiber  3 gm : 2 tabs daily     No current facility-administered medications for this visit. Review of Symptoms:    CONST  No weight change. No fever, chills, sweats    ENT No visual changes, URI sx, sore throat    CV  See HPI   RESP  No cough, or sputum, wheezing. Also see HPI   GI  No abdominal pain or change in bowel habits. No heartburn or dysphagia. No melena or rectal bleeding.   No dysuria, urgency, frequency, hematuria   MSKEL  No joint pain, swelling. No muscle pain. SKIN  No rash or lesions. NEURO  No headache, syncope, or seizure. No weakness, loss of sensation, or paresthesias. PSYCH  No low mood or depression  No anxiety. HE/LYMPH  No easy bruising, abnormal bleeding, or enlarged glands.         Physical ExamPhysical Exam:    Visit Vitals    BP (!) 130/98 (BP 1 Location: Right arm, BP Patient Position: Sitting)    Pulse 86    Resp 18    Ht 5' 3\" (1.6 m)    Wt 171 lb (77.6 kg)    LMP 05/27/1976    SpO2 97%    BMI 30.29 kg/m2     Gen: NAD  HEENT:  PERRL, throat clear  Neck: no adenopathy, no thyromegaly, no JVD   Heart:  irregular,Nl S1S2,  II/VI murmur, no gallop or rub.   Lungs:  clear  Abdomen:   Soft, non-tender, bowel sounds are active.   Extremities:  No edema  Pulse: symmetric  Neuro: A&O times 3, No focal neuro deficits    Cardiographics    ECG: afib, HR 94      Labs:   Lab Results   Component Value Date/Time    Sodium 139 05/22/2018 04:07 PM    Sodium 138 05/07/2018 03:48 PM    Sodium 139 07/24/2017 11:00 AM    Sodium 140 01/18/2017 10:29 AM    Sodium 140 07/18/2016 10:13 AM    Potassium 3.7 05/22/2018 04:07 PM    Potassium 3.6 05/07/2018 03:48 PM    Potassium 4.3 07/24/2017 11:00 AM    Potassium 4.4 01/18/2017 10:29 AM    Potassium 4.5 07/18/2016 10:13 AM    Chloride 100 05/22/2018 04:07 PM    Chloride 95 (L) 05/07/2018 03:48 PM    Chloride 99 07/24/2017 11:00 AM    Chloride 99 01/18/2017 10:29 AM    Chloride 100 07/18/2016 10:13 AM    CO2 27 05/22/2018 04:07 PM    CO2 26 05/07/2018 03:48 PM    CO2 27 07/24/2017 11:00 AM    CO2 27 01/18/2017 10:29 AM    CO2 25 07/18/2016 10:13 AM    Anion gap 10 12/07/2011 02:11 AM    Glucose 135 (H) 05/22/2018 04:07 PM    Glucose 153 (H) 05/07/2018 03:48 PM    Glucose 110 (H) 07/24/2017 11:00 AM    Glucose 116 (H) 01/18/2017 10:29 AM    Glucose 118 (H) 07/18/2016 10:13 AM    BUN 15 05/22/2018 04:07 PM    BUN 13 05/07/2018 03:48 PM    BUN 13 07/24/2017 11:00 AM    BUN 12 01/18/2017 10:29 AM    BUN 13 07/18/2016 10:13 AM    Creatinine 0.81 05/22/2018 04:07 PM    Creatinine 0.63 05/07/2018 03:48 PM    Creatinine 0.62 07/24/2017 11:00 AM    Creatinine 0.57 01/18/2017 10:29 AM    Creatinine 0.61 07/18/2016 10:13 AM    BUN/Creatinine ratio 19 05/22/2018 04:07 PM    BUN/Creatinine ratio 21 05/07/2018 03:48 PM    BUN/Creatinine ratio 21 07/24/2017 11:00 AM    BUN/Creatinine ratio 21 01/18/2017 10:29 AM    BUN/Creatinine ratio 21 07/18/2016 10:13 AM    GFR est AA 77 05/22/2018 04:07 PM    GFR est AA 95 05/07/2018 03:48 PM    GFR est AA 96 07/24/2017 11:00 AM    GFR est  01/18/2017 10:29 AM    GFR est AA 98 07/18/2016 10:13 AM GFR est non-AA 67 05/22/2018 04:07 PM    GFR est non-AA 83 05/07/2018 03:48 PM    GFR est non-AA 84 07/24/2017 11:00 AM    GFR est non-AA 87 01/18/2017 10:29 AM    GFR est non-AA 85 07/18/2016 10:13 AM    Calcium 9.0 05/22/2018 04:07 PM    Calcium 9.2 05/07/2018 03:48 PM    Calcium 9.2 07/24/2017 11:00 AM    Calcium 9.1 01/18/2017 10:29 AM    Calcium 9.2 07/18/2016 10:13 AM    Bilirubin, total 0.4 05/22/2018 04:07 PM    Bilirubin, total 0.3 05/07/2018 03:48 PM    Bilirubin, total 0.5 07/24/2017 11:00 AM    Bilirubin, total 0.4 01/18/2017 10:29 AM    Bilirubin, total 0.3 07/18/2016 10:13 AM    AST (SGOT) 21 05/22/2018 04:07 PM    AST (SGOT) 22 05/07/2018 03:48 PM    AST (SGOT) 37 07/24/2017 11:00 AM    AST (SGOT) 21 01/18/2017 10:29 AM    AST (SGOT) 19 07/18/2016 10:13 AM    Alk. phosphatase 59 05/22/2018 04:07 PM    Alk. phosphatase 60 05/07/2018 03:48 PM    Alk. phosphatase 77 07/24/2017 11:00 AM    Alk. phosphatase 59 01/18/2017 10:29 AM    Alk.  phosphatase 52 07/18/2016 10:13 AM    Protein, total 6.1 05/22/2018 04:07 PM    Protein, total 6.1 05/07/2018 03:48 PM    Protein, total 6.2 07/24/2017 11:00 AM    Protein, total 5.9 (L) 01/18/2017 10:29 AM    Protein, total 5.9 (L) 07/18/2016 10:13 AM    Albumin 4.2 05/22/2018 04:07 PM    Albumin 4.0 05/07/2018 03:48 PM    Albumin 4.1 07/24/2017 11:00 AM    Albumin 3.8 01/18/2017 10:29 AM    Albumin 4.0 07/18/2016 10:13 AM    Globulin 3.4 12/07/2011 02:11 AM    A-G Ratio 2.2 05/22/2018 04:07 PM    A-G Ratio 1.9 05/07/2018 03:48 PM    A-G Ratio 2.0 07/24/2017 11:00 AM    A-G Ratio 1.8 01/18/2017 10:29 AM    A-G Ratio 2.1 07/18/2016 10:13 AM    ALT (SGPT) 18 05/22/2018 04:07 PM    ALT (SGPT) 18 05/07/2018 03:48 PM    ALT (SGPT) 41 (H) 07/24/2017 11:00 AM    ALT (SGPT) 24 01/18/2017 10:29 AM    ALT (SGPT) 13 07/18/2016 10:13 AM     No results found for: CPK, CPKX, CPX  No results found for: CHOL, CHOLX, CHLST, CHOLV, 266016, HDL, LDL, LDLC, DLDLP, TGLX, TRIGL, TRIGP, [facility], [address]  [facility]  No results found for this or any previous visit. Assessment:         Patient Active Problem List    Diagnosis Date Noted    Persistent atrial fibrillation ([address] 06/25/2018    Non-rheumatic mitral regurgitation 06/25/2018    Essential hypertension 05/07/2018    Acquired hypothyroidism 05/07/2018    Pre-diabetes 03/06/2018    BMI 30.0-30.9,adult 03/06/2018    Rectocele     Angular blepharoconjunctivitis of both eyes 05/30/2012    Allergic rhinitis 03/27/2012    Atypical chest pain 08/11/2011    IBS (irritable bowel syndrome) 08/11/2011    DJD (degenerative joint disease) 08/11/2011     80 y.o. female is here for cardiac evaluation, recent onset afib, seen by PCP 5/22/18. Hx hypertension, pre-DM, mild obesity, Holter 5/25/18 with afib troughout, HR , occ PVC's. Echo 5/31/18 with mild LVH, LVEF 55-60, mod-sev LAE, post MV flail with mod MR, MAC, AV sclerosis/no stenosis, mild pulm hypertension. Is on Xarelto 20 and metoprolol succ 50mg bid, lotrel. CHADs2-VASC 4-5. Sx of fatigue, DURAND, \"quesy\" after taking Xarelto. .      Plan:     Favor GINGER to further evaluate mitral valve, and r/o YESENIA thrombus  If no clot, attempt [facility]  Continue anticoagulation--Xarelto for now  Will change the Metoprolol XL to qhs 100mg (now taking 50mg bid), see if this helps with fatigue  Likely anti-arrhythmic  ?need for MV repair  Significantly symptomatic, would consider other options (eg RFA/PVI) as well    Sarita Irwin MD

## 2018-06-25 NOTE — PROGRESS NOTES
Verified patient with two patient identifiers. Medications reviewed/approved by Dr. Eliza Almaguer. Verbal from Dr. Eliza Almaguer to remove the medications that were deleted during the visit. Chief Complaint   Patient presents with    Irregular Heart Beat     New patient evaluation - referred by Dr. Brigido Castleman    Leg Swelling     1. Have you been to the ER, urgent care clinic since your last visit? Hospitalized since your last visit? New pt    2. Have you seen or consulted any other health care providers outside of the [address] [place] [address] since your last visit? Include any pap smears or colon screening. New pt.

## 2018-06-25 NOTE — MR AVS SNAPSHOT
Farzana Fontana 
 
 
 [address] [facility] [address] [address] [phone] Patient: Charan Ferro MRN: [id number] AAT:[birth year] Visit Information Date & Time Provider Department Dept. Phone Encounter #  
 6/25/2018  3:20 PM Alicja Gerry, [address] [facility] Cardiology [facility] [phone] Your Appointments 8/24/2018  2:30 PM  
ESTABLISHED PATIENT with MD Darrius Varela [address], [address] ([facility]) Appt Note: physical; physical  
 [address] [address] [facility] [email or web address] [address] [address]  
[phone]  
  
   
 [address] [address] [email or web address] [address] Upcoming Health Maintenance Date Due  
 GLAUCOMA SCREENING Q2Y 2/7/2016 Influenza Age 5 to Adult 8/1/2018 MEDICARE YEARLY EXAM 3/7/2019 DTaP/Tdap/Td series (2 - Td) 3/6/2028 Allergies as of 6/25/2018  Review Complete On: 6/25/2018 By: Celia Bhatia LPN Severity Noted Reaction Type Reactions Augmentin [Amoxicillin-pot Clavulanate]  12/20/2011    Nausea and Vomiting Bactrim [Sulfamethoprim Ds]  03/04/2013    Other (comments) Palpitations, diarrhea Ciprofloxacin  01/16/2018    Unknown (comments) Codeine  08/11/2011    Unknown (comments) Hydrocodone  08/11/2011    Unknown (comments) Levaquin [Levofloxacin]  08/11/2011    Rash Melatonin  10/13/2011    Other (comments)  
 nightmares Morphine  08/11/2011    Unknown (comments) Pataday [Olopatadine]  03/27/2012    Other (comments) Eye itching Current Immunizations  Reviewed on 3/6/2018 Name Date Influenza High Dose Vaccine PF 10/30/2017 Influenza Vaccine 10/27/2014, 12/5/2013 Influenza Vaccine Split 11/29/2012 Pneumococcal Conjugate (PCV-13) 11/6/2017 Pneumococcal Vaccine (Unspecified Type) 11/11/1999 Td 3/5/2011, 4/7/1997 Not reviewed this visit You Were Diagnosed With   
  
 Codes Comments Persistent atrial fibrillation (HCC)    -  Primary ICD-10-CM: I48.1 ICD-9-CM: 427.31 Non-rheumatic mitral regurgitation     ICD-10-CM: I34.0 ICD-9-CM: 424.0 Essential hypertension     ICD-10-CM: I10 
ICD-9-CM: 401.9 Acquired hypothyroidism     ICD-10-CM: E03.9 ICD-9-CM: 244.9 Pre-diabetes     ICD-10-CM: R73.03 
ICD-9-CM: 790.29 Primary osteoarthritis involving multiple joints     ICD-10-CM: M15.0 ICD-9-CM: 715.09 Vitals BP Pulse Resp Height(growth percentile) Weight(growth percentile) LMP  
 (!) 130/92 (BP 1 Location: Right arm, BP Patient Position: Sitting) 86 18 5' 3\" (1.6 m) 171 lb (77.6 kg) 05/27/1976 SpO2 BMI OB Status Smoking Status 97% 30.29 kg/m2 Hysterectomy Never Smoker Vitals History BMI and BSA Data Body Mass Index Body Surface Area  
 30.29 kg/m 2 1.86 m 2 Preferred Pharmacy Pharmacy Name Phone [id number] [address] [phone] Your Updated Medication List  
  
   
This list is accurate as of 6/25/18  4:56 PM.  Always use your most recent med list. amLODIPine-benazepril 5-20 mg per capsule Commonly known as:  Kimberly Novel Take 1 Cap by mouth daily. ascorbic acid (vitamin C) 500 mg tablet Commonly known as:  VITAMIN C Take  by mouth. CALTRATE WITH VITAMIN D3 PO Take 600 mg by mouth.  
  
 levothyroxine 125 mcg tablet Commonly known as:  SYNTHROID  
TAKE 1 TABLET EVERY DAY  
  
 LORATADINE PO Take  by mouth. MAGNESIUM PO Take  by mouth.  
  
 metoprolol succinate 50 mg XL tablet Commonly known as:  TOPROL-XL Take 1 Tab by mouth two (2) times a day. * OTHER Glucosamine Chondroitin 3tablets daily * OTHER Glucosamine + MSM 1500 mg  , 2 daily. Psyllium Fiber  3 gm : 2 tabs daily  
  
 rivaroxaban 20 mg Tab tablet Commonly known as:  Genegabby Cano Take 1 Tab by mouth daily (with dinner). XANAX 0.5 mg tablet Generic drug:  ALPRAZolam  
Take  by mouth nightly as needed for Anxiety. * Notice: This list has 2 medication(s) that are the same as other medications prescribed for you. Read the directions carefully, and ask your doctor or other care provider to review them with you. We Performed the Following AMB POC EKG ROUTINE W/ 12 LEADS, INTER & REP [53061 CPT(R)] Introducing [facility] SERVICES! Dear Cassandra Naranjo: 
Thank you for requesting a [employer] account. Our records indicate that you already have an active [employer] account. You can access your account anytime at [email or web address] Did you know that you can access your hospital and ER discharge instructions at any time in [employer]? You can also review all of your test results from your hospital stay or ER visit. Additional Information If you have questions, please visit the Frequently Asked Questions section of the [employer] website at [email or web address] Remember, [employer] is NOT to be used for urgent needs. For medical emergencies, dial 911. Now available from your iPhone and Android! Please provide this summary of care documentation to your next provider. Your primary care clinician is listed as Belkis Reyez. If you have any questions after today's visit, please call [phone].

## 2018-07-13 ENCOUNTER — HOSPITAL ENCOUNTER (OUTPATIENT)
Dept: NON INVASIVE DIAGNOSTICS | Age: 83
Discharge: HOME | End: 2018-07-13
Payer: MEDICARE

## 2018-07-13 VITALS
RESPIRATION RATE: 14 BRPM | WEIGHT: 170 LBS | BODY MASS INDEX: 30.12 KG/M2 | HEART RATE: 93 BPM | HEIGHT: 63 IN | OXYGEN SATURATION: 94 % | SYSTOLIC BLOOD PRESSURE: 124 MMHG | DIASTOLIC BLOOD PRESSURE: 82 MMHG

## 2018-07-13 DIAGNOSIS — I48.91 ATRIAL FIBRILLATION (HCC): ICD-10-CM

## 2018-07-13 LAB
ATRIAL RATE: 109 BPM
CALCULATED P AXIS, ECG09: -72 DEGREES
CALCULATED R AXIS, ECG10: 77 DEGREES
CALCULATED T AXIS, ECG11: 45 DEGREES
DIAGNOSIS, 93000: NORMAL
P-R INTERVAL, ECG05: 200 MS
Q-T INTERVAL, ECG07: 352 MS
QRS DURATION, ECG06: 92 MS
QTC CALCULATION (BEZET), ECG08: 474 MS
VENTRICULAR RATE, ECG03: 109 BPM

## 2018-07-13 PROCEDURE — 74011000250 HC RX REV CODE- 250: Performed by: INTERNAL MEDICINE

## 2018-07-13 PROCEDURE — 93325 DOPPLER ECHO COLOR FLOW MAPG: CPT

## 2018-07-13 PROCEDURE — 92960 CARDIOVERSION ELECTRIC EXT: CPT

## 2018-07-13 PROCEDURE — 99152 MOD SED SAME PHYS/QHP 5/>YRS: CPT

## 2018-07-13 PROCEDURE — 99153 MOD SED SAME PHYS/QHP EA: CPT

## 2018-07-13 PROCEDURE — 74011000250 HC RX REV CODE- 250

## 2018-07-13 PROCEDURE — 74011250636 HC RX REV CODE- 250/636

## 2018-07-13 PROCEDURE — 93041 RHYTHM ECG TRACING: CPT

## 2018-07-13 PROCEDURE — 77030018729 HC ELECTRD DEFIB PAD CARD -B

## 2018-07-13 RX ORDER — LIDOCAINE HYDROCHLORIDE 20 MG/ML
15 SOLUTION OROPHARYNGEAL ONCE
Status: COMPLETED | OUTPATIENT
Start: 2018-07-13 | End: 2018-07-13

## 2018-07-13 RX ORDER — FENTANYL CITRATE 50 UG/ML
INJECTION, SOLUTION INTRAMUSCULAR; INTRAVENOUS
Status: COMPLETED
Start: 2018-07-13 | End: 2018-07-13

## 2018-07-13 RX ORDER — MIDAZOLAM HYDROCHLORIDE 1 MG/ML
INJECTION, SOLUTION INTRAMUSCULAR; INTRAVENOUS
Status: COMPLETED
Start: 2018-07-13 | End: 2018-07-13

## 2018-07-13 RX ORDER — FENTANYL CITRATE 50 UG/ML
25-50 INJECTION, SOLUTION INTRAMUSCULAR; INTRAVENOUS
Status: DISCONTINUED | OUTPATIENT
Start: 2018-07-13 | End: 2018-07-13

## 2018-07-13 RX ORDER — MIDAZOLAM HYDROCHLORIDE 1 MG/ML
.5-2 INJECTION, SOLUTION INTRAMUSCULAR; INTRAVENOUS
Status: DISCONTINUED | OUTPATIENT
Start: 2018-07-13 | End: 2018-07-13

## 2018-07-13 RX ORDER — LIDOCAINE HYDROCHLORIDE 20 MG/ML
SOLUTION OROPHARYNGEAL
Status: COMPLETED
Start: 2018-07-13 | End: 2018-07-13

## 2018-07-13 RX ADMIN — BENZOCAINE, BUTAMBEN, AND TETRACAINE HYDROCHLORIDE 1 SPRAY: .028; .004; .004 AEROSOL, SPRAY TOPICAL at 08:17

## 2018-07-13 RX ADMIN — MIDAZOLAM HYDROCHLORIDE 1 MG: 1 INJECTION, SOLUTION INTRAMUSCULAR; INTRAVENOUS at 08:23

## 2018-07-13 RX ADMIN — MIDAZOLAM HYDROCHLORIDE 1 MG: 1 INJECTION, SOLUTION INTRAMUSCULAR; INTRAVENOUS at 08:38

## 2018-07-13 RX ADMIN — MIDAZOLAM HYDROCHLORIDE 1 MG: 1 INJECTION, SOLUTION INTRAMUSCULAR; INTRAVENOUS at 08:18

## 2018-07-13 RX ADMIN — FENTANYL CITRATE 25 MCG: 50 INJECTION, SOLUTION INTRAMUSCULAR; INTRAVENOUS at 08:25

## 2018-07-13 RX ADMIN — MIDAZOLAM HYDROCHLORIDE 1 MG: 1 INJECTION, SOLUTION INTRAMUSCULAR; INTRAVENOUS at 08:40

## 2018-07-13 RX ADMIN — MIDAZOLAM HYDROCHLORIDE 1 MG: 1 INJECTION, SOLUTION INTRAMUSCULAR; INTRAVENOUS at 08:35

## 2018-07-13 RX ADMIN — FENTANYL CITRATE 25 MCG: 50 INJECTION, SOLUTION INTRAMUSCULAR; INTRAVENOUS at 08:19

## 2018-07-13 RX ADMIN — FENTANYL CITRATE 25 MCG: 50 INJECTION, SOLUTION INTRAMUSCULAR; INTRAVENOUS at 08:36

## 2018-07-13 RX ADMIN — LIDOCAINE HYDROCHLORIDE 15 ML: 20 SOLUTION ORAL; TOPICAL at 08:16

## 2018-07-13 RX ADMIN — LIDOCAINE HYDROCHLORIDE 15 ML: 20 SOLUTION OROPHARYNGEAL at 08:16

## 2018-07-13 NOTE — PROGRESS NOTES
Discharge instructions reviewed with patient and , Shelbi Fan. Allowed adequate time to ask questions, all questions answered. Printed copy of AVS given to patient. All belongings gathered, IV and tele discontinued. Transported via wheelchair to main entrance and into care of family.

## 2018-07-13 NOTE — PROGRESS NOTES
ASA 3 and Mallampati 3 per Dr. Justin Castañeda    Pt sedated with 2mg Versed and 50mcg Fentanyl for GINGER (monitored sedation from 0818 to 0834)    Pt sedated with an additional 3mg Versed and 25mcg Fentanyl, given 1 synchronized shock(s) at 360 Joules, Afib converted to Sinus tachycardia with 1st degree AVB. (monitored sedation from 0835 to 0855)    EKG obtained

## 2018-07-13 NOTE — DISCHARGE INSTRUCTIONS
DISCHARGE SUMMARY from [facility], RN        The following personal items collected during your admission are returned to you:   Dental Appliance:    Vision:    Hearing Aid:    Jewelry:    Clothing:          PATIENT INSTRUCTIONS: Continue taking all the same medications , plus begin taking Amiodarone 200mg daily. You had a transesophageal echocardiogram, your throat was numbed for the procedure, so start with sips of water and advance diet as swallowing returns to normal. Avoid any scalding hot beverages for the next 2 hours. The cardioversion procedure can cause redness to the skin where the patches were placed. You may treat this with Aloe or Cortisone cream over the counter lotion. If the skin appears very reddened or blistered contact your physician      Call to make an appointment with Dr. Negra De Guzman in 1 week(s). What to do at Home:  Recommended activity: No driving today      The discharge information has been reviewed with the PATIENT . The PATIENT  verbalized understanding.

## 2018-07-13 NOTE — PROGRESS NOTES
Patient arrived to Non-Invasive Cardiology Lab for Out Patient GINGER and [facility] Procedure. Staff introduced to patient. Patient identifiers verified with Name and Date of Birth. Procedure verified with patient. Consent forms reviewed and signed by patient or authorized representative and verified. Allergies verified. Patient informed of procedure and plan of care. Questions answered with review. Patient on cardiac monitor, non-invasive blood pressure, SPO2 monitor. On room air. Patient is A&Ox3. Patient reports no complaints. Patient on stretcher, in low position, with side rails up. Patient instructed to call for assistance as needed. Family in waiting room.

## 2018-07-13 NOTE — PROCEDURES
[id number] [facility]    Xuan Marie  MR#: [id number]  : 1934  ACCOUNT #: [de-identified]   DATE OF SERVICE: 2018    REFERRING PHYSICIAN:  Dr. Jose Crain. INDICATION:  Atrial fibrillation. MEDICATIONS USED:  Versed 3 mg, fentanyl 25 mcg. DESCRIPTION OF PROCEDURE:  After obtaining informed consent, a transesophageal echocardiogram was performed to rule out left atrial appendage thrombus. Subsequent to this, the patient was given additional sedation as noted above with constant monitoring of the heart rate, blood pressure and oxygen saturations. Synchronized, biphasic electricity using 360 joules x1 was utilized. Patient converted to sinus tachycardia. Rhythm was confirmed by a 12-lead EKG. COMPLICATIONS:  None. ESTIMATED BLOOD LOSS:  None. SPECIMEN OBTAINED:  None. RECOMMENDATIONS:  1. Patient will continue metoprolol 100 mg daily. 2.  Continue Xarelto. 3.  Amiodarone 200 mg once daily until seen by Dr. Reyes Nielsen.       Gerardo Terrell MD       PVR / LN  D: 2018 08:51     T: 2018 09:12  JOB #: [id number]  CC: Kimmie Naranjo MD

## 2018-07-16 ENCOUNTER — OFFICE VISIT (OUTPATIENT)
Dept: CARDIOLOGY CLINIC | Age: 83
End: 2018-07-16

## 2018-07-16 VITALS
HEIGHT: 63 IN | BODY MASS INDEX: 29.77 KG/M2 | WEIGHT: 168 LBS | RESPIRATION RATE: 16 BRPM | DIASTOLIC BLOOD PRESSURE: 106 MMHG | OXYGEN SATURATION: 96 % | SYSTOLIC BLOOD PRESSURE: 140 MMHG | HEART RATE: 120 BPM

## 2018-07-16 DIAGNOSIS — I48.19 PERSISTENT ATRIAL FIBRILLATION (HCC): Primary | ICD-10-CM

## 2018-07-16 DIAGNOSIS — I10 ESSENTIAL HYPERTENSION: ICD-10-CM

## 2018-07-16 DIAGNOSIS — I34.0 NON-RHEUMATIC MITRAL REGURGITATION: ICD-10-CM

## 2018-07-16 NOTE — PROGRESS NOTES
Amanda De La Rosa is a 80 y.o. female is here for hospital f/u--s/p GINGER/cardioversion on 7/13 with restoration of NSR. GINGER showed mitral valve prolapse with moderate regurg, normal LV function, YESENIA ok (no thrombus). Had been given rx for amiodarone, but concerned about side effects so has not started. Recent onset afib, seen by PCP 5/22/18. Hx hypertension, pre-DM, mild obesity, Holter 5/25/18 with afib troughout, HR , occ PVC's. Echo 5/31/18 with mild LVH, LVEF 55-60, mod-sev LAE, post MV flail with mod MR, MAC, AV sclerosis/no stenosis, mild pulm hypertension. Is on Xarelto 20 and metoprolol succ 50mg bid, lotrel. CHADs2-VASC 4-5. Sx of fatigue, DURAND, some palpitations--feels like she is back out of rhythm. The patient denies chest pain, orthopnea, PND, LE edema,  syncope, presyncope or fatigue.        Patient Active Problem List    Diagnosis Date Noted    Persistent atrial fibrillation ([facility] [address] 06/25/2018    Non-rheumatic mitral regurgitation 06/25/2018    Essential hypertension 05/07/2018    Acquired hypothyroidism 05/07/2018    Pre-diabetes 03/06/2018    BMI 30.0-30.9,adult 03/06/2018    Rectocele     Angular blepharoconjunctivitis of both eyes 05/30/2012    Allergic rhinitis 03/27/2012    Atypical chest pain 08/11/2011    IBS (irritable bowel syndrome) 08/11/2011    DJD (degenerative joint disease) 08/11/2011      Uriel Mayers MD  Past Medical History:   Diagnosis Date    A-fib [facility])     Allergic rhinitis 3/27/2012    Angular blepharoconjunctivitis of both eyes 5/30/2012    Anxiety     Atypical chest pain 8/11/2011    Back pain     Cancer ([address]     skin carcinoma excision    Constipation     Dermatochalasis     DJD (degenerative joint disease) 8/11/2011    Glucose intolerance (impaired glucose tolerance) 8/11/2011    Hepatitis     HTN (hypertension) 8/11/2011    HX OTHER MEDICAL 2009    Cardiac cath negative , in [place]    Hypothyroidism 8/11/2011    IBS (irritable bowel syndrome) 8/11/2011    Mitral regurgitation     Onychomycosis     Osteopenia     PAC (premature atrial contraction)     Rectocele     Stress     Viral hepatitis     Weakness     Zoster       Past Surgical History:   Procedure Laterality Date    BREAST SURGERY PROCEDURE UNLISTED      rt breast bx neg.     ENDOSCOPY, COLON, DIAGNOSTIC  2010    diverticulosis, dr. Collins Montalvo, f/u     HX APPENDECTOMY      HX COLONOSCOPY      HX GYN      D and C, BTL, Hysterectomy('76)    HX OTHER SURGICAL      tonsilectomy    HX OTHER SURGICAL      eyelid sgy     HX TONSILLECTOMY      VASCULAR SURGERY PROCEDURE UNLIST      vein stripping     Allergies   Allergen Reactions    Augmentin [Amoxicillin-Pot Clavulanate] Nausea and Vomiting    Bactrim [Sulfamethoprim Ds] Other (comments)     Palpitations, diarrhea    Ciprofloxacin Unknown (comments)    Codeine Unknown (comments)    Hydrocodone Unknown (comments)    Levaquin [Levofloxacin] Rash    Melatonin Other (comments)     nightmares    Morphine Unknown (comments)    Pataday [Olopatadine] Other (comments)     Eye itching       Family History   Problem Relation Age of Onset    Diabetes Father     Heart Attack Father     Hypertension Father     Stroke Mother     Heart Attack Mother      CABG    Hypertension Mother     Breast Cancer Sister     Diabetes Sister     Hypertension Sister     No Known Problems Brother     No Known Problems Maternal Grandmother     No Known Problems Maternal Grandfather     No Known Problems Paternal Grandmother     No Known Problems Paternal Grandfather     No Known Problems Other     Cancer Son      bladder cancer    Diabetes Son     Diabetes Sister     Hypertension Sister     Diabetes Sister     Hypertension Sister     Heart Attack Sister       Social History     Social History    Marital status:      Spouse name: Sherly Berry Number of children: 6    Years of education: N/A     Occupational History    Not on file. Social History Main Topics    Smoking status: Never Smoker    Smokeless tobacco: Never Used    Alcohol use 2.4 oz/week     4 Glasses of wine per week      Comment: no alcohol for 2 months, since afib diagnosis    Drug use: No    Sexual activity: No     Other Topics Concern    Not on file     Social History Narrative      Current Outpatient Prescriptions   Medication Sig    calcium carbonate/vitamin D3 (CALTRATE WITH VITAMIN D3 PO) Take 600 mg by mouth.  MAGNESIUM PO Take  by mouth.  LORATADINE PO Take  by mouth.  metoprolol succinate (TOPROL-XL) 50 mg XL tablet Take 1 Tab by mouth two (2) times a day. (Patient taking differently: Take 100 mg by mouth nightly.)    rivaroxaban (XARELTO) 20 mg tab tablet Take 1 Tab by mouth daily (with dinner).  amLODIPine-benazepril (LOTREL) 5-20 mg per capsule Take 1 Cap by mouth daily.  ALPRAZolam (XANAX) 0.5 mg tablet Take  by mouth nightly as needed for Anxiety.  levothyroxine (SYNTHROID) 125 mcg tablet TAKE 1 TABLET EVERY DAY    OTHER Glucosamine + MSM 1500 mg  , 2 daily. Psyllium Fiber  3 gm : 2 tabs daily    OTHER Glucosamine Chondroitin 3tablets daily    ascorbic acid (VITAMIN C) 500 mg tablet Take  by mouth. No current facility-administered medications for this visit. Review of Symptoms:    CONST  No weight change. No fever, chills, sweats    ENT No visual changes, URI sx, sore throat    CV  See HPI   RESP  No cough, or sputum, wheezing. Also see HPI   GI  No abdominal pain or change in bowel habits. No heartburn or dysphagia. No melena or rectal bleeding.   No dysuria, urgency, frequency, hematuria   MSKEL  No joint pain, swelling. No muscle pain. SKIN  No rash or lesions. NEURO  No headache, syncope, or seizure. No weakness, loss of sensation, or paresthesias. PSYCH  No low mood or depression  No anxiety. HE/LYMPH  No easy bruising, abnormal bleeding, or enlarged glands. Physical ExamPhysical Exam:    Visit Vitals    BP (!) 140/106 (BP 1 Location: Left arm, BP Patient Position: Sitting)    Pulse (!) 120    Resp 16    Ht 5' 3\" (1.6 m)    Wt 168 lb (76.2 kg)    LMP 05/27/1976    SpO2 96%    BMI 29.76 kg/m2     Gen: NAD  HEENT:  PERRL, throat clear  Neck: no adenopathy, no thyromegaly, no JVD   Heart:  irregular,Nl S1S2,  II/VI murmur, no gallop or rub.   Lungs:  clear  Abdomen:   Soft, non-tender, bowel sounds are active.   Extremities:  No edema  Pulse: symmetric  Neuro: A&O times 3, No focal neuro deficits    Cardiographics    ECG: afib/RVR      Labs:   Lab Results   Component Value Date/Time    Sodium 139 05/22/2018 04:07 PM    Sodium 138 05/07/2018 03:48 PM    Sodium 139 07/24/2017 11:00 AM    Sodium 140 01/18/2017 10:29 AM    Sodium 140 07/18/2016 10:13 AM    Potassium 3.7 05/22/2018 04:07 PM    Potassium 3.6 05/07/2018 03:48 PM    Potassium 4.3 07/24/2017 11:00 AM    Potassium 4.4 01/18/2017 10:29 AM    Potassium 4.5 07/18/2016 10:13 AM    Chloride 100 05/22/2018 04:07 PM    Chloride 95 (L) 05/07/2018 03:48 PM    Chloride 99 07/24/2017 11:00 AM    Chloride 99 01/18/2017 10:29 AM    Chloride 100 07/18/2016 10:13 AM    CO2 27 05/22/2018 04:07 PM    CO2 26 05/07/2018 03:48 PM    CO2 27 07/24/2017 11:00 AM    CO2 27 01/18/2017 10:29 AM    CO2 25 07/18/2016 10:13 AM    Anion gap 10 12/07/2011 02:11 AM    Glucose 135 (H) 05/22/2018 04:07 PM    Glucose 153 (H) 05/07/2018 03:48 PM    Glucose 110 (H) 07/24/2017 11:00 AM    Glucose 116 (H) 01/18/2017 10:29 AM    Glucose 118 (H) 07/18/2016 10:13 AM    BUN 15 05/22/2018 04:07 PM    BUN 13 05/07/2018 03:48 PM    BUN 13 07/24/2017 11:00 AM    BUN 12 01/18/2017 10:29 AM    BUN 13 07/18/2016 10:13 AM    Creatinine 0.81 05/22/2018 04:07 PM    Creatinine 0.63 05/07/2018 03:48 PM    Creatinine 0.62 07/24/2017 11:00 AM    Creatinine 0.57 01/18/2017 10:29 AM    Creatinine 0.61 07/18/2016 10:13 AM    BUN/Creatinine ratio 19 05/22/2018 04:07 PM    BUN/Creatinine ratio 21 05/07/2018 03:48 PM    BUN/Creatinine ratio 21 07/24/2017 11:00 AM    BUN/Creatinine ratio 21 01/18/2017 10:29 AM    BUN/Creatinine ratio 21 07/18/2016 10:13 AM    GFR est AA 77 05/22/2018 04:07 PM    GFR est AA 95 05/07/2018 03:48 PM    GFR est AA 96 07/24/2017 11:00 AM    GFR est  01/18/2017 10:29 AM    GFR est AA 98 07/18/2016 10:13 AM    GFR est non-AA 67 05/22/2018 04:07 PM    GFR est non-AA 83 05/07/2018 03:48 PM    GFR est non-AA 84 07/24/2017 11:00 AM    GFR est non-AA 87 01/18/2017 10:29 AM    GFR est non-AA 85 07/18/2016 10:13 AM    Calcium 9.0 05/22/2018 04:07 PM    Calcium 9.2 05/07/2018 03:48 PM    Calcium 9.2 07/24/2017 11:00 AM    Calcium 9.1 01/18/2017 10:29 AM    Calcium 9.2 07/18/2016 10:13 AM    Bilirubin, total 0.4 05/22/2018 04:07 PM    Bilirubin, total 0.3 05/07/2018 03:48 PM    Bilirubin, total 0.5 07/24/2017 11:00 AM    Bilirubin, total 0.4 01/18/2017 10:29 AM    Bilirubin, total 0.3 07/18/2016 10:13 AM    AST (SGOT) 21 05/22/2018 04:07 PM    AST (SGOT) 22 05/07/2018 03:48 PM    AST (SGOT) 37 07/24/2017 11:00 AM    AST (SGOT) 21 01/18/2017 10:29 AM    AST (SGOT) 19 07/18/2016 10:13 AM    Alk. phosphatase 59 05/22/2018 04:07 PM    Alk. phosphatase 60 05/07/2018 03:48 PM    Alk. phosphatase 77 07/24/2017 11:00 AM    Alk. phosphatase 59 01/18/2017 10:29 AM    Alk.  phosphatase 52 07/18/2016 10:13 AM    Protein, total 6.1 05/22/2018 04:07 PM    Protein, total 6.1 05/07/2018 03:48 PM    Protein, total 6.2 07/24/2017 11:00 AM    Protein, total 5.9 (L) 01/18/2017 10:29 AM    Protein, total 5.9 (L) 07/18/2016 10:13 AM    Albumin 4.2 05/22/2018 04:07 PM    Albumin 4.0 05/07/2018 03:48 PM    Albumin 4.1 07/24/2017 11:00 AM    Albumin 3.8 01/18/2017 10:29 AM    Albumin 4.0 07/18/2016 10:13 AM    Globulin 3.4 12/07/2011 02:11 AM    A-G Ratio 2.2 05/22/2018 04:07 PM    A-G Ratio 1.9 05/07/2018 03:48 PM    A-G Ratio 2.0 07/24/2017 11:00 AM    A-G Ratio 1.8 01/18/2017 10:29 AM    A-G Ratio 2.1 07/18/2016 10:13 AM    ALT (SGPT) 18 05/22/2018 04:07 PM    ALT (SGPT) 18 05/07/2018 03:48 PM    ALT (SGPT) 41 (H) 07/24/2017 11:00 AM    ALT (SGPT) 24 01/18/2017 10:29 AM    ALT (SGPT) 13 07/18/2016 10:13 AM     No results found for: CPK, CPKX, CPX  No results found for: CHOL, CHOLX, CHLST, CHOLV, 080921, HDL, LDL, LDLC, DLDLP, TGLX, TRIGL, TRIGP, CHHD, CHHDX  No results found for this or any previous visit. Assessment:         Patient Active Problem List    Diagnosis Date Noted    Persistent atrial fibrillation ([facility] [address] 06/25/2018    Non-rheumatic mitral regurgitation 06/25/2018    Essential hypertension 05/07/2018    Acquired hypothyroidism 05/07/2018    Pre-diabetes 03/06/2018    BMI 30.0-30.9,adult 03/06/2018    Rectocele     Angular blepharoconjunctivitis of both eyes 05/30/2012    Allergic rhinitis 03/27/2012    Atypical chest pain 08/11/2011    IBS (irritable bowel syndrome) 08/11/2011    DJD (degenerative joint disease) 08/11/2011     Here for hospital f/u--s/p GINGER/cardioversion on 7/13 with restoration of NSR. GINGER showed mitral valve prolapse with moderate regurg, normal LV function, YESENIA ok (no thrombus). Had been given rx for amiodarone, but concerned about side effects so has not started. Recent onset afib, seen by PCP 5/22/18. Hx hypertension, pre-DM, mild obesity, Holter 5/25/18 with afib troughout, HR , occ PVC's. Echo 5/31/18 with mild LVH, LVEF 55-60, mod-sev LAE, post MV flail with mod MR, MAC, AV sclerosis/no stenosis, mild pulm hypertension. Is on Xarelto 20 and metoprolol succ 50mg bid, lotrel. CHADs2-VASC 4-5. Sx of fatigue, DURAND, some palpitations--feels like she is back out of rhythm.      Plan:     Continue anticoagulation--Xarelto for now  Will change the Metoprolol XL to qhs 100mg (now taking 50mg bid), see if this helps with fatigue  Likely anti-arrhythmic--she was concerned about amiodarone toxicity after reading and did not start  ?need for MV repair--does not appear severe at this point by TTE and GINGER  Significantly symptomatic, would consider other options (eg RFA/PVI) as well--will refer to Dr Jacy Argueta to discuss all options    Hussain Talavera MD

## 2018-07-16 NOTE — MR AVS SNAPSHOT
[address] 
 
 
 [address] [facility] [address] [address] [phone] Patient: Froilan Duong MRN: [id number] LEP:[birth year] Visit Information Date & Time Provider Department Dept. Phone Encounter #  
 7/16/2018  3:20 PM Ayleen Hayden, [address] [facility] [address] [place] [address] [phone] Your Appointments 8/24/2018  2:30 PM  
ESTABLISHED PATIENT with MD Liset Padron [address], [address] [facility] ([address]) Appt Note: physical; physical  
 [address] [facility] [address] [phone]  
  
   
 [address] [facility] [phone] Upcoming Health Maintenance Date Due  
 GLAUCOMA SCREENING Q2Y 2/7/2016 Influenza Age 5 to Adult 8/1/2018 MEDICARE YEARLY EXAM 3/7/2019 DTaP/Tdap/Td series (2 - Td) 3/6/2028 Allergies as of 7/16/2018  Review Complete On: 7/16/2018 By: Ayleen Hayden MD  
  
 Severity Noted Reaction Type Reactions Augmentin [Amoxicillin-pot Clavulanate]  12/20/2011    Nausea and Vomiting Bactrim [Sulfamethoprim Ds]  03/04/2013    Other (comments) Palpitations, diarrhea Ciprofloxacin  01/16/2018    Unknown (comments) Codeine  08/11/2011    Unknown (comments) Hydrocodone  08/11/2011    Unknown (comments) Levaquin [Levofloxacin]  08/11/2011    Rash Melatonin  10/13/2011    Other (comments)  
 nightmares Morphine  08/11/2011    Unknown (comments) Pataday [Olopatadine]  03/27/2012    Other (comments) Eye itching Current Immunizations  Reviewed on 3/6/2018 Name Date Influenza High Dose Vaccine PF 10/30/2017 Influenza Vaccine 10/27/2014, 12/5/2013 Influenza Vaccine Split 11/29/2012 Pneumococcal Conjugate (PCV-13) 11/6/2017 Pneumococcal Vaccine (Unspecified Type) 11/11/1999 Td 3/5/2011, 4/7/1997 Not reviewed this visit You Were Diagnosed With   
  
 Codes Comments Persistent atrial fibrillation (HCC)    -  Primary ICD-10-CM: I48.1 ICD-9-CM: 427.31 Non-rheumatic mitral regurgitation     ICD-10-CM: I34.0 ICD-9-CM: 424.0 Essential hypertension     ICD-10-CM: I10 
ICD-9-CM: 401.9 Vitals BP Pulse Resp Height(growth percentile) Weight(growth percentile) LMP  
 (!) 140/106 (BP 1 Location: Left arm, BP Patient Position: Sitting) (!) 120 16 5' 3\" (1.6 m) 168 lb (76.2 kg) 05/27/1976 SpO2 BMI OB Status Smoking Status 96% 29.76 kg/m2 Hysterectomy Never Smoker Vitals History BMI and BSA Data Body Mass Index Body Surface Area  
 29.76 kg/m 2 1.84 m 2 Preferred Pharmacy Pharmacy Name Phone [id number] [address] [place] [address] [place] [phone] Your Updated Medication List  
  
   
This list is accurate as of 7/16/18  4:10 PM.  Always use your most recent med list. amLODIPine-benazepril 5-20 mg per capsule Commonly known as:  [place] Take 1 Cap by mouth daily. ascorbic acid (vitamin C) 500 mg tablet Commonly known as:  VITAMIN C Take  by mouth. CALTRATE WITH VITAMIN D3 PO Take 600 mg by mouth.  
  
 levothyroxine 125 mcg tablet Commonly known as:  SYNTHROID  
TAKE 1 TABLET EVERY DAY  
  
 LORATADINE PO Take  by mouth. MAGNESIUM PO Take  by mouth.  
  
 metoprolol succinate 50 mg XL tablet Commonly known as:  TOPROL-XL Take 1 Tab by mouth two (2) times a day. * OTHER Glucosamine Chondroitin 3tablets daily * OTHER Glucosamine + MSM 1500 mg  , 2 daily. Psyllium Fiber  3 gm : 2 tabs daily  
  
 rivaroxaban 20 mg Tab tablet Commonly known as:  Cassandra Tamayo Take 1 Tab by mouth daily (with dinner). XANAX 0.5 mg tablet Generic drug:  ALPRAZolam  
Take  by mouth nightly as needed for Anxiety. * Notice:   This list has 2 medication(s) that are the same as other medications prescribed for you. Read the directions carefully, and ask your doctor or other care provider to review them with you. We Performed the Following AMB POC EKG ROUTINE W/ 12 LEADS, INTER & REP [57679 CPT(R)] Introducing [facility]! Dear Andrew Simpson: 
Thank you for requesting a FanM[employer] account. Our records indicate that you already have an active FanM[employer] account. You can access your account anytime at [email or web address] Did you know that you can access your hospital and ER discharge instructions at any time in FanMob? You can also review all of your test results from your hospital stay or ER visit. Additional Information If you have questions, please visit the Frequently Asked Questions section of the FanMob website at [email or web address] Remember, FanMob is NOT to be used for urgent needs. For medical emergencies, dial 911. Now available from your iPhone and Android! Please provide this summary of care documentation to your next provider. Your primary care clinician is listed as Ashish Herrmann. If you have any questions after today's visit, please call [phone].

## 2018-08-18 DIAGNOSIS — F41.1 ANXIETY AS ACUTE REACTION TO EXCEPTIONAL STRESS: ICD-10-CM

## 2018-08-18 DIAGNOSIS — I48.19 PERSISTENT ATRIAL FIBRILLATION (HCC): ICD-10-CM

## 2018-08-18 DIAGNOSIS — F43.0 ANXIETY AS ACUTE REACTION TO EXCEPTIONAL STRESS: ICD-10-CM

## 2018-08-23 RX ORDER — METOPROLOL SUCCINATE 50 MG/1
TABLET, EXTENDED RELEASE ORAL
Qty: 60 TAB | Refills: 2 | Status: SHIPPED | OUTPATIENT
Start: 2018-08-23 | End: 2018-10-17

## 2018-08-24 PROBLEM — E11.9 TYPE 2 DIABETES MELLITUS WITHOUT COMPLICATION, WITHOUT LONG-TERM CURRENT USE OF INSULIN (HCC): Status: ACTIVE | Noted: 2018-08-24

## 2018-08-26 PROBLEM — I10 ESSENTIAL HYPERTENSION: Status: RESOLVED | Noted: 2018-05-07 | Resolved: 2018-08-26

## 2018-08-26 PROBLEM — R73.03 PRE-DIABETES: Status: RESOLVED | Noted: 2018-03-06 | Resolved: 2018-08-26

## 2018-08-26 PROBLEM — I15.2 HYPERTENSION COMPLICATING DIABETES (HCC): Status: ACTIVE | Noted: 2018-08-26

## 2018-08-26 PROBLEM — E11.59 HYPERTENSION COMPLICATING DIABETES (HCC): Status: ACTIVE | Noted: 2018-08-26

## 2018-08-30 ENCOUNTER — OFFICE VISIT (OUTPATIENT)
Dept: CARDIOLOGY CLINIC | Age: 83
End: 2018-08-30

## 2018-08-30 VITALS
SYSTOLIC BLOOD PRESSURE: 132 MMHG | DIASTOLIC BLOOD PRESSURE: 88 MMHG | WEIGHT: 171.2 LBS | OXYGEN SATURATION: 93 % | HEART RATE: 88 BPM | HEIGHT: 62 IN | BODY MASS INDEX: 31.5 KG/M2 | RESPIRATION RATE: 18 BRPM

## 2018-08-30 DIAGNOSIS — E11.59 HYPERTENSION COMPLICATING DIABETES (HCC): ICD-10-CM

## 2018-08-30 DIAGNOSIS — E11.9 TYPE 2 DIABETES MELLITUS WITHOUT COMPLICATION, WITHOUT LONG-TERM CURRENT USE OF INSULIN (HCC): ICD-10-CM

## 2018-08-30 DIAGNOSIS — E03.9 ACQUIRED HYPOTHYROIDISM: ICD-10-CM

## 2018-08-30 DIAGNOSIS — I48.19 PERSISTENT ATRIAL FIBRILLATION (HCC): ICD-10-CM

## 2018-08-30 DIAGNOSIS — I15.2 HYPERTENSION COMPLICATING DIABETES (HCC): ICD-10-CM

## 2018-08-30 DIAGNOSIS — I49.9 IRREGULAR HEARTBEAT: Primary | ICD-10-CM

## 2018-08-30 NOTE — PROGRESS NOTES
1. Have you been to the ER, urgent care clinic since your last visit? Hospitalized since your last visit? No    2. Have you seen or consulted any other health care providers outside of the [address] [place] [address] since your last visit? Include any pap smears or colon screening. No    Chief Complaint   Patient presents with    Irregular Heart Beat     NP, ref by Dr. Mac Renteria. A fib. C/O palps, SOB with exertion.

## 2018-08-30 NOTE — PROGRESS NOTES
Subjective:      Diane Whitehead is a 80 y.o. female is here for EP consult. Hx hypertension, pre-DM, mild obesity, Holter 5/25/18 with afib troughout, HR , occ PVC's. Echo 5/31/18 with mild LVH, LVEF 55-60, mod-sev LAE, post MV flail with mod MR, MAC, AV sclerosis/no stenosis, mild pulm hypertension. Is on Xarelto 20 and metoprolol succ 50mg bid, lotrel. CHADs2-VASC 4-5. The patient reports symptomatic AF with dyspnea and lower extremity edema. Was referred by her cardiologist from the [place] however she now lives in [place] and states this location is not convenient.      Patient Active Problem List    Diagnosis Date Noted    Irregular heartbeat 08/30/2018    Hypertension complicating diabetes ([address] 08/26/2018    Type 2 diabetes mellitus without complication, without long-term current use of insulin ([facility]) 08/24/2018    Persistent atrial fibrillation ([address] 06/25/2018    Non-rheumatic mitral regurgitation 06/25/2018    Acquired hypothyroidism 05/07/2018    BMI 30.0-30.9,adult 03/06/2018    Rectocele     Angular blepharoconjunctivitis of both eyes 05/30/2012    Allergic rhinitis 03/27/2012    Atypical chest pain 08/11/2011    IBS (irritable bowel syndrome) 08/11/2011    DJD (degenerative joint disease) 08/11/2011      MARQUES Deng MD  Past Medical History:   Diagnosis Date    A-fib [facility])     Allergic rhinitis 3/27/2012    Angular blepharoconjunctivitis of both eyes 5/30/2012    Anxiety     Atypical chest pain 8/11/2011    Back pain     Cancer ([address]     skin carcinoma excision    Constipation     Dermatochalasis     DJD (degenerative joint disease) 8/11/2011    Hepatitis     HTN (hypertension) 8/11/2011    HX OTHER MEDICAL 2009    Cardiac cath negative , in [facility] Hypothyroidism 8/11/2011    IBS (irritable bowel syndrome) 8/11/2011    Mitral regurgitation     Onychomycosis     Osteopenia     PAC (premature atrial contraction)     Rectocele  Stress     Type 2 diabetes mellitus without complication (HCC)     Viral hepatitis     Weakness     Zoster       Past Surgical History:   Procedure Laterality Date    BREAST SURGERY PROCEDURE UNLISTED      rt breast bx neg.     ENDOSCOPY, COLON, DIAGNOSTIC  2010    diverticulosis, dr. Richard Avery, f/u     HX APPENDECTOMY      HX COLONOSCOPY      HX GYN      D and C, BTL, Hysterectomy('76)    HX OTHER SURGICAL      tonsilectomy    HX OTHER SURGICAL      eyelid sgy     HX TONSILLECTOMY      VASCULAR SURGERY PROCEDURE UNLIST      vein stripping     Allergies   Allergen Reactions    Augmentin [Amoxicillin-Pot Clavulanate] Nausea and Vomiting    Bactrim [Sulfamethoprim Ds] Other (comments)     Palpitations, diarrhea    Ciprofloxacin Unknown (comments)    Codeine Unknown (comments)    Hydrocodone Unknown (comments)    Levaquin [Levofloxacin] Rash    Melatonin Other (comments)     nightmares    Morphine Unknown (comments)    Pataday [Olopatadine] Other (comments)     Eye itching       Family History   Problem Relation Age of Onset    Diabetes Father     Heart Attack Father     Hypertension Father     Stroke Mother     Heart Attack Mother      CABG    Hypertension Mother     Breast Cancer Sister     Diabetes Sister     Hypertension Sister     No Known Problems Brother     No Known Problems Maternal Grandmother     No Known Problems Maternal Grandfather     No Known Problems Paternal Grandmother     No Known Problems Paternal Grandfather     No Known Problems Other    [facility] Cancer Son      bladder cancer    Diabetes Son     Diabetes Sister     Hypertension Sister     Diabetes Sister     Hypertension Sister     Heart Attack Sister     negative for cardiac disease  Social History     Social History    Marital status:      Spouse name: Rochelle Price Number of children: 10    Years of education: N/A     Social History Main Topics    Smoking status: Never Smoker    Smokeless tobacco: Never Used    Alcohol use No      Comment: no alcohol for 2 months, since afib diagnosis    Drug use: No    Sexual activity: No     Other Topics Concern    None     Social History Narrative     Current Outpatient Prescriptions   Medication Sig    metoprolol succinate (TOPROL-XL) 50 mg XL tablet TAKE ONE TABLET BY MOUTH TWICE A DAY    calcium carbonate/vitamin D3 (CALTRATE WITH VITAMIN D3 PO) Take 600 mg by mouth daily.  MAGNESIUM PO Take  by mouth daily.  LORATADINE PO Take 10 mg by mouth daily.  rivaroxaban (XARELTO) 20 mg tab tablet Take 1 Tab by mouth daily (with dinner).  amLODIPine-benazepril (LOTREL) 5-20 mg per capsule Take 1 Cap by mouth daily.  ALPRAZolam (XANAX) 0.5 mg tablet Take  by mouth nightly as needed for Anxiety.  levothyroxine (SYNTHROID) 125 mcg tablet TAKE 1 TABLET EVERY DAY    OTHER Glucosamine Chondroitin 3tablets daily    ascorbic acid (VITAMIN C) 500 mg tablet Take 500 mg by mouth daily.  OTHER Glucosamine + MSM 1500 mg  , 2 daily. Psyllium Fiber  3 gm : 2 tabs daily     No current facility-administered medications for this visit. Vitals:    08/30/18 0946   BP: 132/88   Pulse: 88   Resp: 18   SpO2: 93%   Weight: 171 lb 3.2 oz (77.7 kg)   Height: 5' 2.25\" (1.581 m)       I have reviewed the nurses notes, vitals, problem list, allergy list, medical history, family, social history and medications. Review of Symptoms:    General: Pt denies excessive weight gain or loss. Pt is able to conduct ADL's  HEENT: Denies blurred vision, headaches, epistaxis and difficulty swallowing. Respiratory: Reports shortness of breath, DURAND.   Cardiovascular: Denies precordial pain, palpitations, reports edema   Gastrointestinal: Denies poor appetite, indigestion, abdominal pain or blood in stool  Urinary: Denies dysuria, pyuria  Musculoskeletal: Denies pain or swelling from muscles or joints  Neurologic: Denies tremor, paresthesias, or sensory motor disturbance  Skin: Denies rash, itching or texture change. Psych: Denies depression      Physical Exam:      General: Well developed, in no acute distress. HEENT: Eyes - PERRL, no jvd  Heart:  Irregularly, irregular  Respiratory: Clear bilaterally x 4, no wheezing or rales  Extremities:  + edema, R>L, normal cap refill, no cyanosis. Musculoskeletal: No clubbing  Neuro: A&Ox3, speech clear, gait stable. Skin: Skin color is normal. No rashes or lesions. Non diaphoretic  Vascular: 2+ pulses symmetric in all extremities    Cardiographics    Ekg: atrial fib/atrial flutter    Results for orders placed or performed in visit on 05/30/12   AMB POC EKG ROUTINE W/ 12 LEADS, INTER & REP    Narrative    Sinus Bradycardia. Normal EKG         Lab Results   Component Value Date/Time    WBC 6.9 08/24/2018 03:24 PM    HGB (POC) 15.2 07/24/2017 11:24 AM    HGB 14.1 08/24/2018 03:24 PM    HCT (POC) 47.3 07/24/2017 11:24 AM    HCT 42.4 08/24/2018 03:24 PM    PLATELET 252 75/51/7948 03:24 PM    MCV 86 08/24/2018 03:24 PM      Lab Results   Component Value Date/Time    Sodium 138 08/24/2018 03:24 PM    Potassium 4.2 08/24/2018 03:24 PM    Chloride 98 08/24/2018 03:24 PM    CO2 27 08/24/2018 03:24 PM    Anion gap 10 12/07/2011 02:11 AM    Glucose 94 08/24/2018 03:24 PM    BUN 10 08/24/2018 03:24 PM    Creatinine 0.61 08/24/2018 03:24 PM    BUN/Creatinine ratio 16 08/24/2018 03:24 PM    GFR est AA 96 08/24/2018 03:24 PM    GFR est non-AA 84 08/24/2018 03:24 PM    Calcium 9.3 08/24/2018 03:24 PM    Bilirubin, total 0.5 08/24/2018 03:24 PM    AST (SGOT) 22 08/24/2018 03:24 PM    Alk. phosphatase 59 08/24/2018 03:24 PM    Protein, total 6.2 08/24/2018 03:24 PM    Albumin 3.7 08/24/2018 03:24 PM    Globulin 3.4 12/07/2011 02:11 AM    A-G Ratio 1.5 08/24/2018 03:24 PM    ALT (SGPT) 16 08/24/2018 03:24 PM      Lab Results   Component Value Date/Time    TSH 2.380 05/22/2018 04:07 PM        Assessment:     Assessment:        ICD-10-CM ICD-9-CM    1.  Irregular heartbeat I49.9 427.9 AMB POC EKG ROUTINE W/ 12 LEADS, INTER & REP   2. Persistent atrial fibrillation (HCC) I48.1 427.31    3. Type 2 diabetes mellitus without complication, without long-term current use of insulin (HCC) E11.9 250.00    4. Acquired hypothyroidism E03.9 244.9    5. BMI 30.0-30.9,adult Z68.30 V85.30    6. Hypertension complicating diabetes ([facility] E11.59 250.80     I10 401.9      Orders Placed This Encounter    AMB POC EKG ROUTINE W/ 12 LEADS, INTER & REP     Order Specific Question:   Reason for Exam:     Answer:   routine        Plan:     Ms Jeffrey Mcmillan is an 80year old female with symptomatic atrial fibrillation, s/p failed cardioversion on xarelto. She is a candidate for ablation. She now lives in [place]  And would prefer [facility]. Will refer to Dr Anish Freeman. Continue medical management for AF, MR, DM. Thank you for allowing me to participate in [facility]. Jeramie Ayala NP    Patient seen and examined. All pertinent data reviewed. I have reviewed detailed note as outlined by Jeramie Ayala NP. Case discussed with Nursing/medical assistant staff and Jeramie Ayala NP. Plans as outlined. Symptomatic AF with mod LAE.MR. She appears to be a good candidate for AF ablation and AAD therapy. 65-70% success rate with ablation. Cont oac. She lives in [facility] and wanted to seek care there. Will refer her to Dr Al Thompson.      Marilyn Camacho MD, Bob Rosales

## 2019-05-17 ENCOUNTER — HOSPITAL ENCOUNTER (OUTPATIENT)
Dept: CT IMAGING | Age: 84
Discharge: HOME | End: 2019-05-17
Attending: INTERNAL MEDICINE
Payer: MEDICARE

## 2019-05-17 DIAGNOSIS — R41.3 MEMORY LOSS: ICD-10-CM

## 2019-05-17 PROCEDURE — 70450 CT HEAD/BRAIN W/O DYE: CPT

## 2019-05-21 PROBLEM — Z86.73 HISTORY OF CVA (CEREBROVASCULAR ACCIDENT): Status: ACTIVE | Noted: 2019-05-21

## 2019-05-28 ENCOUNTER — HOSPITAL ENCOUNTER (OUTPATIENT)
Dept: VASCULAR SURGERY | Age: 84
Discharge: HOME | End: 2019-05-28
Attending: INTERNAL MEDICINE
Payer: MEDICARE

## 2019-05-28 DIAGNOSIS — Z86.73 HISTORY OF CVA (CEREBROVASCULAR ACCIDENT): ICD-10-CM

## 2019-05-28 PROCEDURE — 93880 EXTRACRANIAL BILAT STUDY: CPT

## 2019-05-29 LAB
LEFT CCA DIST DIAS: 15 CM/S
LEFT CCA DIST SYS: 50 CM/S
LEFT CCA PROX DIAS: 15 CM/S
LEFT CCA PROX SYS: 78 CM/S
LEFT ECA DIAS: 12.28 CM/S
LEFT ECA SYS: 70.4 CM/S
LEFT ICA DIST DIAS: 15.3 CM/S
LEFT ICA DIST SYS: 40.3 CM/S
LEFT ICA MID DIAS: 11.7 CM/S
LEFT ICA MID SYS: 40.3 CM/S
LEFT ICA PROX DIAS: 12.3 CM/S
LEFT ICA PROX SYS: 73.7 CM/S
LEFT ICA/CCA SYS: 1.47
LEFT SUBCLAVIAN DIAS: 0 CM/S
LEFT SUBCLAVIAN SYS: 107.6 CM/S
LEFT VERTEBRAL DIAS: 8.93 CM/S
LEFT VERTEBRAL SYS: 34.8 CM/S
RIGHT CCA DIST DIAS: 8.6 CM/S
RIGHT CCA DIST SYS: 46.4 CM/S
RIGHT CCA PROX DIAS: 13.8 CM/S
RIGHT CCA PROX SYS: 59.5 CM/S
RIGHT ECA DIAS: 10.18 CM/S
RIGHT ECA SYS: 76.4 CM/S
RIGHT ICA DIST DIAS: 9 CM/S
RIGHT ICA DIST SYS: 35.2 CM/S
RIGHT ICA MID DIAS: 11.3 CM/S
RIGHT ICA MID SYS: 36.1 CM/S
RIGHT ICA PROX DIAS: 9 CM/S
RIGHT ICA PROX SYS: 34.3 CM/S
RIGHT ICA/CCA SYS: 0.8
RIGHT SUBCLAVIAN DIAS: 0 CM/S
RIGHT SUBCLAVIAN SYS: 53.4 CM/S
RIGHT VERTEBRAL DIAS: 6.37 CM/S
RIGHT VERTEBRAL SYS: 27.3 CM/S

## 2019-06-15 ENCOUNTER — HOSPITAL ENCOUNTER (OUTPATIENT)
Dept: MRI IMAGING | Age: 84
Discharge: HOME | End: 2019-06-15
Attending: INTERNAL MEDICINE
Payer: MEDICARE

## 2019-06-15 VITALS — BODY MASS INDEX: 29.39 KG/M2 | WEIGHT: 162 LBS

## 2019-06-15 DIAGNOSIS — Z79.899 ENCOUNTER FOR LONG-TERM (CURRENT) USE OF OTHER MEDICATIONS: ICD-10-CM

## 2019-06-15 DIAGNOSIS — H54.7 VISION PROBLEM: ICD-10-CM

## 2019-06-15 DIAGNOSIS — Z86.73 HISTORY OF CVA (CEREBROVASCULAR ACCIDENT): ICD-10-CM

## 2019-06-15 DIAGNOSIS — E78.00 HYPERCHOLESTEROLEMIA: ICD-10-CM

## 2019-06-15 PROCEDURE — 74011250636 HC RX REV CODE- 250/636: Performed by: RADIOLOGY

## 2019-06-15 PROCEDURE — 70553 MRI BRAIN STEM W/O & W/DYE: CPT

## 2019-06-15 PROCEDURE — A9575 INJ GADOTERATE MEGLUMI 0.1ML: HCPCS | Performed by: RADIOLOGY

## 2019-06-15 RX ORDER — GADOTERATE MEGLUMINE 376.9 MG/ML
14 INJECTION INTRAVENOUS
Status: COMPLETED | OUTPATIENT
Start: 2019-06-15 | End: 2019-06-15

## 2019-06-15 RX ADMIN — GADOTERATE MEGLUMINE 14 ML: 376.9 INJECTION INTRAVENOUS at 18:52

## 2019-06-17 NOTE — PROGRESS NOTES
Pc. Gave results.  Intermittent  blurry vision in am  is unchanged vs in my office recently   P: Neurology referral

## 2019-06-19 ENCOUNTER — OFFICE VISIT (OUTPATIENT)
Dept: NEUROLOGY | Age: 84
End: 2019-06-19

## 2019-06-19 VITALS
WEIGHT: 166.23 LBS | DIASTOLIC BLOOD PRESSURE: 88 MMHG | BODY MASS INDEX: 29.45 KG/M2 | HEIGHT: 63 IN | HEART RATE: 77 BPM | OXYGEN SATURATION: 91 % | SYSTOLIC BLOOD PRESSURE: 122 MMHG

## 2019-06-19 DIAGNOSIS — D32.0 MENINGIOMA, CEREBRAL (HCC): ICD-10-CM

## 2019-06-19 DIAGNOSIS — E78.2 MIXED HYPERLIPIDEMIA: ICD-10-CM

## 2019-06-19 DIAGNOSIS — R41.3 MEMORY LOSS: ICD-10-CM

## 2019-06-19 DIAGNOSIS — I48.20 CHRONIC ATRIAL FIBRILLATION (HCC): ICD-10-CM

## 2019-06-19 DIAGNOSIS — I63.312 CEREBROVASCULAR ACCIDENT (CVA) DUE TO THROMBOSIS OF LEFT MIDDLE CEREBRAL ARTERY (HCC): Primary | ICD-10-CM

## 2019-06-19 NOTE — PROGRESS NOTES
NEUROLOGY CLINIC NOTE    Patient ID:  Saloni Patel  [id number]  48 y.o.  1934    Date of Consultation:  June 19, 2019    Referring Physician: Dr. Mi Rebolledo    Reason for Consultation:  Stroke    Chief Complaint   Patient presents with    New Patient     2nd silent stroke (recent)       History of Present Illness:     Patient Active Problem List    Diagnosis Date Noted    History of CVA (cerebrovascular accident) 05/21/2019    Irregular heartbeat 08/30/2018    Hypertension complicating diabetes ([address] 08/26/2018    Type 2 diabetes mellitus without complication, without long-term current use of insulin ([address] 08/24/2018    Persistent atrial fibrillation ([address] 06/25/2018    Non-rheumatic mitral regurgitation 06/25/2018    Acquired hypothyroidism 05/07/2018    BMI 30.0-30.9,adult 03/06/2018    Rectocele     Angular blepharoconjunctivitis of both eyes 05/30/2012    Allergic rhinitis 03/27/2012    Atypical chest pain 08/11/2011    IBS (irritable bowel syndrome) 08/11/2011    DJD (degenerative joint disease) 08/11/2011     Past Medical History:   Diagnosis Date    A-fib ([address]     Allergic rhinitis 3/27/2012    Angular blepharoconjunctivitis of both eyes 5/30/2012    Anxiety     Atypical chest pain 8/11/2011    Back pain     Cancer ([address]     skin carcinoma excision    Constipation     Dermatochalasis     DJD (degenerative joint disease) 8/11/2011    Hepatitis     History of CVA (cerebrovascular accident) 5/21/2019    May 2019 Head CT . Chronic Right Parietal CVA.      HTN (hypertension) 8/11/2011    HX OTHER MEDICAL 2009    [facility] , [facility] Hypothyroidism 8/11/2011    IBS (irritable bowel syndrome) 8/11/2011    Mitral regurgitation     Onychomycosis     Osteopenia     PAC (premature atrial contraction)     Rectocele     Stress     Stroke (HCC)     Type 2 diabetes mellitus without complication (HCC)     Viral hepatitis     Weakness     Zoster Past Surgical History:   Procedure Laterality Date    BREAST SURGERY PROCEDURE UNLISTED      rt breast bx neg.  ENDOSCOPY, COLON, DIAGNOSTIC  2010    diverticulosis, dr. Jono Arana, f/u     HX APPENDECTOMY      HX COLONOSCOPY      HX GYN      D and C, BTL, Hysterectomy('76)    HX OTHER SURGICAL      tonsilectomy    HX OTHER SURGICAL      eyelid sgy     HX TONSILLECTOMY      VASCULAR SURGERY PROCEDURE UNLIST      vein stripping      Prior to Admission medications    Medication Sig Start Date End Date Taking? Authorizing Provider   pravastatin (PRAVACHOL) 10 mg tablet Take 1 Tab by mouth nightly. 5/21/19  Yes Savannah Qiu IV, MD   acetaminophen (TYLENOL) 325 mg tablet Take 325 mg by mouth nightly. Yes Provider, Historical   magnesium chloride (MAG DELAY) 64 mg delayed release tablet Take 64 mg by mouth daily. Yes Provider, Historical   levothyroxine (SYNTHROID) 125 mcg tablet TAKE 1 TABLET BY MOUTH DAILY 12/18/18  Yes Savannah Qiu IV, MD   lisinopril (PRINIVIL, ZESTRIL) 10 mg tablet Take 2 Tabs by mouth daily. 12/17/18  Yes Savannah Qiu IV, MD   amLODIPine (NORVASC) 5 mg tablet Take 5 mg by mouth daily. Yes Provider, Historical   dofetilide (TIKOSYN) 125 mcg capsule Take 125 mcg by mouth two (2) times a day. Yes Provider, Historical   metoprolol succinate (TOPROL XL) 25 mg XL tablet Take  by mouth daily. Yes Provider, Historical   potassium chloride SR (KLOR-CON 10) 10 mEq tablet Take 20 mEq by mouth daily. Yes Provider, Historical   calcium carbonate/vitamin D3 (CALTRATE WITH VITAMIN D3 PO) Take 600 mg by mouth daily. Has D3 800 units also   Yes Provider, Historical   rivaroxaban (XARELTO) 20 mg tab tablet Take 1 Tab by mouth daily (with dinner). 5/22/18  Yes Ed Snow MD   ascorbic acid (VITAMIN C) 500 mg tablet Take 500 mg by mouth daily. Yes Provider, Historical   polyethylene glycol (MIRALAX) 17 gram/dose powder Take 17 g by mouth daily as needed. Provider, Historical   ALPRAZolam (XANAX) 0.5 mg tablet Take 1 Tab by mouth nightly as needed for Anxiety. Max Daily Amount: 0.5 mg. 8/30/18   Angelica Renee IV, MD   LORATADINE PO Take 10 mg by mouth daily. Provider, Historical     Allergies   Allergen Reactions    Augmentin [Amoxicillin-Pot Clavulanate] Nausea and Vomiting    Bactrim [Sulfamethoprim Ds] Other (comments)     Palpitations, diarrhea    Ciprofloxacin Unknown (comments)    Codeine Unknown (comments)    Hydrocodone Unknown (comments)    Levaquin [Levofloxacin] Rash    Melatonin Other (comments)     nightmares    Morphine Unknown (comments)    Pataday [Olopatadine] Other (comments)     Eye itching       Social History     Tobacco Use    Smoking status: Never Smoker    Smokeless tobacco: Never Used   Substance Use Topics    Alcohol use: No     Comment: no alcohol for 2 months, since afib diagnosis      Family History   Problem Relation Age of Onset    Diabetes Father     Heart Attack Father     Hypertension Father     Stroke Mother     Heart Attack Mother         CABG    Hypertension Mother     Breast Cancer Sister     Diabetes Sister     Hypertension Sister     No Known Problems Brother     No Known Problems Maternal Grandmother     No Known Problems Maternal Grandfather     No Known Problems Paternal Grandmother     No Known Problems Paternal Grandfather     No Known Problems Other     Cancer Son         bladder cancer    Diabetes Son     Diabetes Sister     Hypertension Sister     Diabetes Sister     Hypertension Sister     Heart Attack Sister         Subjective:      Isreal Lindo is a 80 y.o. DM RHWF with history of stroke, hypertension, diabetes, atrial fibrillation (Xarelto), thyroid disorder, anxiety, osteopenia and DJD who was referred here by ASHLEY LOGAN for finding of acute stroke on MRI.     Patient was seen by her PCP 5/2/2019 complaining of worsening anxiety and stress with impaired short-term memory. Labs done 5/2/2019 revealed normal TSH, free T4, amylase and B12 level. Plan was to continue low-dose alprazolam.    Patient called PCPs clinic 5/13/2019 worried that she may have had a TIA. Head CT without contrast done 5/17/2019 for memory loss revealed no acute intracranial abnormality. Chronic infarct in the right parietal lobe. Duplex carotid bilateral 5/28/2019 revealed <49% bilateral ICA. Patient was placed on pravastatin. Patient was seen again by her PCP 6/10/2019. Patient mentions episodes of her vision darkening and blurry vision that lasted for 1 minute. Also reports occasional headaches and fatigue. Note mentions patient undergoing neuropsychological testing. Labs done 6/10/2019 revealed slightly elevated hgbA1c was 6.2. Unremarkable CBC, CMP and lipid panel but no LDL results. Brain MRI with and without contrast was ordered. Brain MRI with and without contrast done 6/15/2019 or intermittent blurry vision revealed small acute left posterior basal ganglia ischemic infarct. Mild white matter disease. Mild atrophy. 6 x 3 mm left frontal meningioma. Patient was then referred here. Since then, patient reports no recurrence of her symptoms. No complaints of problems with her thought process, slurring of speech, focal numbness or weakness. She does mention episodes of dizziness. Patient is concerned of the mentions of an old stroke from a head CT done in May and now a new stroke from an MRI done recently. Patient is on Xarelto and has been started on pravastatin by her PCP. Echocardiogram is schedule for 7/3/2019 per patient. Outside reports reviewed: office notes, radiology reports, lab reports.     Review of Systems:    A comprehensive review of systems was performed:   Constitutional: positive for fatigue  Eyes: positive for visual disturbance  Ears, nose, mouth, throat, and face: positive for hearing loss, snoring  Respiratory: positive for none  Cardiovascular: positive for leg swelling  Gastrointestinal: positive for constipation, liver issues  Genitourinary: positive for none  Integument/breast: positive for none  Hematologic/lymphatic: positive for none  Musculoskeletal: positive for joint pains, muscle pain, weakness  Neurological: positive for headaches  Behavioral/Psych: positive for anxiety, depression, memory loss  Endocrine: positive for none  Allergic/Immunologic: positive for none      Objective:     Visit Vitals  /88   Pulse 77   Ht 5' 2.5\" (1.588 m)   Wt 166 lb 3.6 oz (75.4 kg)   LMP 05/27/1976   SpO2 91%   BMI 29.92 kg/m²     PHYSICAL EXAM:    General Appearance: Alert, patient appears stated age. General:  Overweight, patient in no apparent distress. Head/Face: The head is normocephalic and atraumatic. Eyes: Conjunctivae appear normal. Sclera appear normal and non-icteric. Nose (and Sinus):   No abnormality of the nose or sinuses is noted. Oral:   Throat is clear. Lymphatics:  No lymphadenopathy in the neck/head. Neck and Thyroid:   No bruits noted in the neck. Respiratory:  Lungs clear to auscultation. Cardiovascular:  Palpation and auscultation: regular rate and rhythm. Extremity: No joint swelling, erythema or pedal edema. NEUROLOGICAL EXAM:    Appearance: The patient is well developed, well nourished, provides a coherent history and is in no acute distress. Mental Status: Oriented to time, place and person. Fluent, no aphasia or dysarthria. Mood and affect appropriate. Cranial Nerves:   Intact visual fields. VA 20/20 OU on near vision. Fundi are benign. SCOUT, EOM's full, no nystagmus, no ptosis. Facial sensation is normal. Corneal reflexes are intact. Facial movement is symmetric. Hearing is normal bilaterally. Palate is midline with normal elevation. Sternocleidomastoid and trapezius muscles are normal. Tongue is midline. Motor:  5/5 strength in upper and lower proximal and distal muscles. Normal bulk and tone.  No fasciculations. No pronator drift. Reflexes:   Deep tendon reflexes 1+/4 and symmetrical. Downgoing toes. Sensory:   Normal to cold, pinprick and vibration. Gait:  Normal gait. No Romberg. Tremor:   No tremor noted. Cerebellar:  Intact FTN/JAMILAH/HTS. Neurovascular:  Normal heart sounds and regular rhythm, peripheral pulses intact, and no carotid bruits. Imaging  CT Head, Brain MRI: reviewed    Labs Reviewed      Assessment:   CVA  Chronic atrial fibrillation  Hyperlipidemia  Memory loss    Plan:   Neurological examination is nonfocal.  No clinical correlate for the small acute stroke found the left basal ganglia. Head CT without contrast revealed old right parietal lobe infarct. Brain MRI with and without contrast revealed small acute left basal ganglia infarct. Also showed mild white matter disease and atrophy. Echocardiogram is pending. Carotid Doppler studies did not reveal any significant carotid artery stenosis. Patient's main risk factor is history of chronic atrial fibrillation but also has other risk factors such as hypertension and diabetes. Patient was advised strict compliance with dietary modifications and medications for hypertension and diabetes. Patient is on Xarelto for stroke prophylaxis. Patient is advised to call [phone] if new deficits occur. Encouraged to continue structured physical exercises. Patient has history of paroxysmal atrial fibrillation and is on Xarelto. Discussed with the patient, that this is likely her main risk factors for future strokes. Patient is being followed by cardiology. Echocardiogram pending. Lipid panel done did not show any results for LDL. Patient was started on pravastatin 10 mg at bedtime by her PCP. Need to obtain LDL levels. Lipid panel was ordered. Given history of stroke it should be less than 70. Advised strict compliance with dietary modifications and medication for hyperlipidemia.     Patient seen previously by PCP for issues with memory. Neuropsychological testing is pending. Patient was advised to do mental exercises as well as structured physical exercises. Advised to use reminder systems, put structure third day and repetitiveness. Brain MRI with and without contrast also reveals small incidental finding of a left frontal lobe meningioma measuring 6 x 3 mm. No mass-effect. Monitor for now. All questions and concerns were answered. Visit lasted 70 minutes. Greater than 50% was spent reviewing her medical records as summarized above, discussion about her condition, etiology, prognosis, stroke prevention and prophylaxis, strict compliance with dietary modifications and medications for hypertension, diabetes and hyperlipidemia, rechecking lipid panel, mental exercises and structured physical exercises, treatment options, medication    This note was created using voice recognition software. Despite editing, there may be syntax errors.

## 2019-06-19 NOTE — LETTER
7/5/19 Patient: Jayla Los YOB: 1934 Date of Visit: 6/19/2019 Coby Staley MD 
[id number] [id number] VIA In Basket Dear Coby Staley MD, Thank you for referring Ms. Vertie Cooks to [address] [place] [address] for evaluation. My notes for this consultation are attached. If you have questions, please do not hesitate to call me. I look forward to following your patient along with you. Sincerely, Rogers Mcmanus MD

## 2019-06-19 NOTE — PATIENT INSTRUCTIONS
Atrial Fibrillation: Care Instructions Your Care Instructions Atrial fibrillation is an irregular and often fast heartbeat. Treating this condition is important for several reasons. It can cause blood clots, which can travel from your heart to your brain and cause a stroke. If you have a fast heartbeat, you may feel lightheaded, dizzy, and weak. An irregular heartbeat can also increase your risk for heart failure. Atrial fibrillation is often the result of another heart condition, such as high blood pressure or coronary artery disease. Making changes to improve your heart condition will help you stay healthy and active. Follow-up care is a key part of your treatment and safety. Be sure to make and go to all appointments, and call your doctor if you are having problems. It's also a good idea to know your test results and keep a list of the medicines you take. How can you care for yourself at home? Medicines 
  · Take your medicines exactly as prescribed. Call your doctor if you think you are having a problem with your medicine. You will get more details on the specific medicines your doctor prescribes.  
  · If your doctor has given you a blood thinner to prevent a stroke, be sure you get instructions about how to take your medicine safely. Blood thinners can cause serious bleeding problems.  
  · Do not take any vitamins, over-the-counter drugs, or herbal products without talking to your doctor first.  
 Lifestyle changes 
  · Do not smoke. Smoking can increase your chance of a stroke and heart attack. If you need help quitting, talk to your doctor about stop-smoking programs and medicines. These can increase your chances of quitting for good.  
  · Eat a heart-healthy diet.  
  · Stay at a healthy weight. Lose weight if you need to.  
  · Limit alcohol to 2 drinks a day for men and 1 drink a day for women. Too much alcohol can cause health problems.   · Avoid colds and flu. Get a pneumococcal vaccine shot. If you have had one before, ask your doctor whether you need another dose. Get a flu shot every year. If you must be around people with colds or flu, wash your hands often. Activity 
  · If your doctor recommends it, get more exercise. Walking is a good choice. Bit by bit, increase the amount you walk every day. Try for at least 30 minutes on most days of the week. You also may want to swim, bike, or do other activities. Your doctor may suggest that you join a cardiac rehabilitation program so that you can have help increasing your physical activity safely.  
  · Start light exercise if your doctor says it is okay. Even a small amount will help you get stronger, have more energy, and manage stress. Walking is an easy way to get exercise. Start out by walking a little more than you did in the hospital. Gradually increase the amount you walk.  
  · When you exercise, watch for signs that your heart is working too hard. You are pushing too hard if you cannot talk while you are exercising. If you become short of breath or dizzy or have chest pain, sit down and rest immediately.  
  · Check your pulse regularly. Place two fingers on the artery at the palm side of your wrist, in line with your thumb. If your heartbeat seems uneven or fast, talk to your doctor. When should you call for help? Call 911 anytime you think you may need emergency care. For example, call if: 
  · You have symptoms of a heart attack. These may include: 
? Chest pain or pressure, or a strange feeling in the chest. 
? Sweating. ? Shortness of breath. ? Nausea or vomiting. ? Pain, pressure, or a strange feeling in the back, neck, jaw, or upper belly or in one or both shoulders or arms. ? Lightheadedness or sudden weakness. ? A fast or irregular heartbeat.  
After you call 911, the  may tell you to chew 1 adult-strength or 2 to 4 low-dose aspirin. Wait for an ambulance. Do not try to drive yourself.  
  · You have symptoms of a stroke. These may include: 
? Sudden numbness, tingling, weakness, or loss of movement in your face, arm, or leg, especially on only one side of your body. ? Sudden vision changes. ? Sudden trouble speaking. ? Sudden confusion or trouble understanding simple statements. ? Sudden problems with walking or balance. ? A sudden, severe headache that is different from past headaches.  
  · You passed out (lost consciousness).  
 Call your doctor now or seek immediate medical care if: 
  · You have new or increased shortness of breath.  
  · You feel dizzy or lightheaded, or you feel like you may faint.  
  · Your heart rate becomes irregular.  
  · You can feel your heart flutter in your chest or skip heartbeats. Tell your doctor if these symptoms are new or worse.  
 Watch closely for changes in your health, and be sure to contact your doctor if you have any problems. Where can you learn more? Go to http://patience-henri.info/. Enter U020 in the search box to learn more about \"Atrial Fibrillation: Care Instructions. \" Current as of: July 22, 2018 Content Version: 11.9 © 9536-4932 [employer]. Care instructions adapted under license by [employer] (which disclaims liability or warranty for this information). If you have questions about a medical condition or this instruction, always ask your healthcare professional. [place] [address] any warranty or liability for your use of this information. Learning About How to Prevent a Stroke What is a stroke? A stroke occurs when a blood vessel in the brain bursts or is blocked by a blood clot. Without blood and the oxygen it carries, part of the brain starts to die. The part of the body controlled by the damaged area of the brain can't work properly. But there are many things you can do to help lower your stroke risk. What increases your risk for stroke? A risk factor is anything that makes you more likely to have a particular health problem. Risk factors for stroke that you can treat or change include: 
· Health problems like high blood pressure, atrial fibrillation, diabetes, and high cholesterol. · Smoking. · Heavy use of alcohol. · Being overweight. · Not getting enough physical activity. Risk factors you can't change include: · Age. The risk of stroke goes up as you get older. · Race.  Americans, Native Americans, and [place] Natives have a higher risk than those of other races. · Being female. Women have a higher risk of stroke than men. · Family history of stroke. Your doctor can help you know your risk. Then you and your can doctor talk about whether you need to lower it. What can you do to prevent a stroke? · Treat any health problems you have that raise your risk. · Adopt a heart-healthy lifestyle: ? Don't smoke. If you need help quitting, talk to your doctor about stop-smoking programs and medicines. These can increase your chances of quitting for good. ? Limit alcohol to 2 drinks a day for men and 1 drink a day for women. ? Stay at a healthy weight. Lose weight if you need to. 
? If your doctor recommends it, get more exercise. Walking is a good choice. Bit by bit, increase the amount you walk every day. Try for at least 30 minutes on most days of the week. ? Eat heart-healthy foods. These include fruits, vegetables, high-fiber foods, and fish. Choose foods that are low in sodium, saturated fat, and trans fat. · Decide with your doctor whether you will also take medicines to help lower your risk. For example, you and your doctor may decide you will take a medicine that prevents blood clots. What are the symptoms of a stroke? The brain damage from a stroke starts within minutes.  That's why it's so important to know the symptoms of stroke and to act fast. Quick treatment can help limit damage to the brain so that you have fewer problems after the stroke. FAST is a simple way to remember the main symptoms of stroke. Recognizing these symptoms helps you know when to call for medical help. FAST stands for: 
· Face drooping. · Arm weakness. · Speech difficulty. · Time to call 911. Follow-up care is a key part of your treatment and safety. Be sure to make and go to all appointments, and call your doctor if you are having problems. It's also a good idea to know your test results and keep a list of the medicines you take. Where can you learn more? Go to http://patienceNovitashenri.info/. Enter G757 in the search box to learn more about \"Learning About How to Prevent a Stroke. \" Current as of: September 26, 2018 Content Version: 11.9 © 5574-9393 [employer]. Care instructions adapted under license by [employer] (which disclaims liability or warranty for this information). If you have questions about a medical condition or this instruction, always ask your healthcare professional. [place] [address] any warranty or liability for your use of this information. Learning About How to Prevent Another Stroke What can you do to prevent another stroke? After a stroke, people feel lots of different emotions. Some people are worried that they could have another stroke. Or they may feel overwhelmed by how much there is to learn and do. Some people feel sad or depressed. No matter what emotions you are feeling, you can give yourself some control and peace of mind by making a plan to lower your risk of having another stroke. Take your medicines You'll need to take medicines to help prevent another stroke. Be sure to take your medicines exactly as prescribed. And don't stop taking them unless your doctor tells you to.  If you stop taking your medicines, you can increase your risk of having another stroke. Some of the medicines your doctor may prescribe include: · Aspirin or some other blood thinner to prevent blood clots. · Statins to lower cholesterol. · Blood pressure medicines to lower blood pressure. Manage other health problems You can help lower your chance of having another stroke by managing certain other health problems. Problems that increase your risk of having another stroke include: · High blood pressure. · High cholesterol. · Atrial fibrillation. · Diabetes. If you have any of these health problems, you can manage them with healthy lifestyle changes along with medicine. Adopt a healthy lifestyle · Do not smoke or allow others to smoke around you. If you need help quitting, talk to your doctor about stop-smoking programs and medicines. These can increase your chances of quitting for good. Smoking makes a stroke more likely. · Limit alcohol to 2 drinks a day for men and 1 drink a day for women. · Lose weight if you need to. Controlling your weight will help you keep your heart and body healthy. · Be active. Ask your doctor what type and level of activity is safe for you. · Eat heart-healthy foods, like fruits, vegetables, and high-fiber foods. It's also important to: · Get a flu shot every year. · Ask for help if you think you are depressed. Do stroke rehab Taking part in a stroke rehabilitation (rehab) program will help you to regain skills you lost or make the most of your abilities after a stroke. It also helps you take steps to prevent another stroke. Your rehab team will give you education and support to help you build new, healthy habits. You'll learn how to manage any other health problems that you might have. Onofre Griffiths also learn how to exercise safely, eat a healthy diet, and quit smoking if you smoke. Onofre Griffiths work with your team to decide what lifestyle choices are best for you. If your doctor hasn't already suggested it, ask him or her if stroke rehab is right for you. Know stroke symptoms Make sure you know the symptoms of stroke. FAST is a simple way to remember. Recognizing these symptoms helps you know when to call for medical help. FAST stands for: 
· Face drooping. · Arm weakness. · Speech difficulty. · Time to call 911. Follow-up care is a key part of your treatment and safety. Be sure to make and go to all appointments, and call your doctor if you are having problems. It's also a good idea to know your test results and keep a list of the medicines you take. Where can you learn more? Go to http://patience-henri.info/. Enter D073 in the search box to learn more about \"Learning About How to Prevent Another Stroke. \" Current as of: September 26, 2018 Content Version: 11.9 © 9576-1463 [employer]. Care instructions adapted under license by [employer] (which disclaims liability or warranty for this information). If you have questions about a medical condition or this instruction, always ask your healthcare professional. [place] [address] any warranty or liability for your use of this information.

## 2019-06-21 LAB
CHOLEST SERPL-MCNC: 143 MG/DL (ref 100–199)
HDLC SERPL-MCNC: 67 MG/DL
LDLC SERPL CALC-MCNC: 66 MG/DL (ref 0–99)
TRIGL SERPL-MCNC: 48 MG/DL (ref 0–149)
VLDLC SERPL CALC-MCNC: 10 MG/DL (ref 5–40)

## 2020-05-15 PROBLEM — S06.5XAA SUBDURAL HEMATOMA (HCC): Status: ACTIVE | Noted: 2020-05-15

## 2022-03-19 PROBLEM — S06.5XAA SUBDURAL HEMATOMA (HCC): Status: ACTIVE | Noted: 2020-05-15

## 2022-03-19 PROBLEM — E03.9 ACQUIRED HYPOTHYROIDISM: Status: ACTIVE | Noted: 2018-05-07

## 2022-03-19 PROBLEM — E11.9 TYPE 2 DIABETES MELLITUS WITHOUT COMPLICATION, WITHOUT LONG-TERM CURRENT USE OF INSULIN (HCC): Status: ACTIVE | Noted: 2018-08-24

## 2022-03-19 PROBLEM — I34.0 NON-RHEUMATIC MITRAL REGURGITATION: Status: ACTIVE | Noted: 2018-06-25

## 2022-03-19 PROBLEM — Z86.73 HISTORY OF CVA (CEREBROVASCULAR ACCIDENT): Status: ACTIVE | Noted: 2019-05-21

## 2022-03-19 PROBLEM — I15.2 HYPERTENSION COMPLICATING DIABETES (HCC): Status: ACTIVE | Noted: 2018-08-26

## 2022-03-19 PROBLEM — E11.59 HYPERTENSION COMPLICATING DIABETES (HCC): Status: ACTIVE | Noted: 2018-08-26

## 2022-03-19 PROBLEM — I48.19 PERSISTENT ATRIAL FIBRILLATION (HCC): Status: ACTIVE | Noted: 2018-06-25

## 2022-03-20 PROBLEM — I49.9 IRREGULAR HEARTBEAT: Status: ACTIVE | Noted: 2018-08-30

## 2023-04-30 ENCOUNTER — APPOINTMENT (OUTPATIENT)
Dept: GENERAL RADIOLOGY | Age: 88
DRG: 243 | End: 2023-04-30
Attending: EMERGENCY MEDICINE
Payer: MEDICARE

## 2023-04-30 ENCOUNTER — APPOINTMENT (OUTPATIENT)
Dept: CT IMAGING | Age: 88
DRG: 243 | End: 2023-04-30
Attending: INTERNAL MEDICINE
Payer: MEDICARE

## 2023-04-30 ENCOUNTER — HOSPITAL ENCOUNTER (INPATIENT)
Age: 88
LOS: 5 days | Discharge: NURSING, REHAB OR LONG-TERM CARE | DRG: 243 | End: 2023-05-05
Attending: EMERGENCY MEDICINE | Admitting: INTERNAL MEDICINE
Payer: MEDICARE

## 2023-04-30 DIAGNOSIS — E03.9 ACQUIRED HYPOTHYROIDISM: ICD-10-CM

## 2023-04-30 DIAGNOSIS — I44.0 FIRST DEGREE AV BLOCK: ICD-10-CM

## 2023-04-30 DIAGNOSIS — I49.5 SSS (SICK SINUS SYNDROME) (HCC): ICD-10-CM

## 2023-04-30 DIAGNOSIS — I48.0 PAF (PAROXYSMAL ATRIAL FIBRILLATION) (HCC): ICD-10-CM

## 2023-04-30 DIAGNOSIS — R00.1 SINUS BRADYCARDIA: ICD-10-CM

## 2023-04-30 DIAGNOSIS — R06.02 SOB (SHORTNESS OF BREATH): Primary | ICD-10-CM

## 2023-04-30 DIAGNOSIS — I95.9 HYPOTENSION, UNSPECIFIED HYPOTENSION TYPE: ICD-10-CM

## 2023-04-30 PROBLEM — R06.00 DYSPNEA: Status: ACTIVE | Noted: 2023-04-30

## 2023-04-30 LAB
ALBUMIN SERPL-MCNC: 3.2 G/DL (ref 3.5–5)
ALBUMIN/GLOB SERPL: 1 (ref 1.1–2.2)
ALP SERPL-CCNC: 49 U/L (ref 45–117)
ALT SERPL-CCNC: 31 U/L (ref 12–78)
ANION GAP SERPL CALC-SCNC: 3 MMOL/L (ref 5–15)
APTT PPP: 36.3 SEC (ref 22.1–31)
AST SERPL-CCNC: 27 U/L (ref 15–37)
BASOPHILS # BLD: 0.1 K/UL (ref 0–0.1)
BASOPHILS NFR BLD: 1 % (ref 0–1)
BILIRUB SERPL-MCNC: 0.2 MG/DL (ref 0.2–1)
BNP SERPL-MCNC: 2798 PG/ML
BUN SERPL-MCNC: 27 MG/DL (ref 6–20)
BUN/CREAT SERPL: 22 (ref 12–20)
CALCIUM SERPL-MCNC: 8.6 MG/DL (ref 8.5–10.1)
CHLORIDE SERPL-SCNC: 101 MMOL/L (ref 97–108)
CO2 SERPL-SCNC: 30 MMOL/L (ref 21–32)
COMMENT, HOLDF: NORMAL
CREAT SERPL-MCNC: 1.21 MG/DL (ref 0.55–1.02)
DIFFERENTIAL METHOD BLD: NORMAL
EOSINOPHIL # BLD: 0.1 K/UL (ref 0–0.4)
EOSINOPHIL NFR BLD: 2 % (ref 0–7)
ERYTHROCYTE [DISTWIDTH] IN BLOOD BY AUTOMATED COUNT: 13.8 % (ref 11.5–14.5)
GLOBULIN SER CALC-MCNC: 3.2 G/DL (ref 2–4)
GLUCOSE SERPL-MCNC: 107 MG/DL (ref 65–100)
HCT VFR BLD AUTO: 42.4 % (ref 35–47)
HGB BLD-MCNC: 13.8 G/DL (ref 11.5–16)
IMM GRANULOCYTES # BLD AUTO: 0 K/UL (ref 0–0.04)
IMM GRANULOCYTES NFR BLD AUTO: 0 % (ref 0–0.5)
INR PPP: 1.4 (ref 0.9–1.1)
LYMPHOCYTES # BLD: 1.3 K/UL (ref 0.8–3.5)
LYMPHOCYTES NFR BLD: 19 % (ref 12–49)
MAGNESIUM SERPL-MCNC: 2 MG/DL (ref 1.6–2.4)
MCH RBC QN AUTO: 30.3 PG (ref 26–34)
MCHC RBC AUTO-ENTMCNC: 32.5 G/DL (ref 30–36.5)
MCV RBC AUTO: 93.2 FL (ref 80–99)
MONOCYTES # BLD: 0.9 K/UL (ref 0–1)
MONOCYTES NFR BLD: 13 % (ref 5–13)
NEUTS SEG # BLD: 4.7 K/UL (ref 1.8–8)
NEUTS SEG NFR BLD: 65 % (ref 32–75)
NRBC # BLD: 0 K/UL (ref 0–0.01)
NRBC BLD-RTO: 0 PER 100 WBC
PLATELET # BLD AUTO: 286 K/UL (ref 150–400)
PMV BLD AUTO: 10.7 FL (ref 8.9–12.9)
POTASSIUM SERPL-SCNC: 5 MMOL/L (ref 3.5–5.1)
PROT SERPL-MCNC: 6.4 G/DL (ref 6.4–8.2)
PROTHROMBIN TIME: 14 SEC (ref 9–11.1)
RBC # BLD AUTO: 4.55 M/UL (ref 3.8–5.2)
SAMPLES BEING HELD,HOLD: NORMAL
SODIUM SERPL-SCNC: 134 MMOL/L (ref 136–145)
THERAPEUTIC RANGE,PTTT: ABNORMAL SECS (ref 58–77)
TROPONIN I SERPL HS-MCNC: 46 NG/L (ref 0–51)
TSH SERPL DL<=0.05 MIU/L-ACNC: 8.06 UIU/ML (ref 0.36–3.74)
WBC # BLD AUTO: 7.1 K/UL (ref 3.6–11)

## 2023-04-30 PROCEDURE — 85025 COMPLETE CBC W/AUTO DIFF WBC: CPT

## 2023-04-30 PROCEDURE — 74011000250 HC RX REV CODE- 250: Performed by: INTERNAL MEDICINE

## 2023-04-30 PROCEDURE — 70450 CT HEAD/BRAIN W/O DYE: CPT

## 2023-04-30 PROCEDURE — 85610 PROTHROMBIN TIME: CPT

## 2023-04-30 PROCEDURE — 99285 EMERGENCY DEPT VISIT HI MDM: CPT

## 2023-04-30 PROCEDURE — 74011250637 HC RX REV CODE- 250/637: Performed by: INTERNAL MEDICINE

## 2023-04-30 PROCEDURE — 71045 X-RAY EXAM CHEST 1 VIEW: CPT

## 2023-04-30 PROCEDURE — 80053 COMPREHEN METABOLIC PANEL: CPT

## 2023-04-30 PROCEDURE — 83880 ASSAY OF NATRIURETIC PEPTIDE: CPT

## 2023-04-30 PROCEDURE — 65610000006 HC RM INTENSIVE CARE

## 2023-04-30 PROCEDURE — 85730 THROMBOPLASTIN TIME PARTIAL: CPT

## 2023-04-30 PROCEDURE — 83735 ASSAY OF MAGNESIUM: CPT

## 2023-04-30 PROCEDURE — 36415 COLL VENOUS BLD VENIPUNCTURE: CPT

## 2023-04-30 PROCEDURE — 84443 ASSAY THYROID STIM HORMONE: CPT

## 2023-04-30 PROCEDURE — 2709999900 HC NON-CHARGEABLE SUPPLY

## 2023-04-30 PROCEDURE — 74011250636 HC RX REV CODE- 250/636: Performed by: INTERNAL MEDICINE

## 2023-04-30 PROCEDURE — 84484 ASSAY OF TROPONIN QUANT: CPT

## 2023-04-30 RX ORDER — SODIUM CHLORIDE 0.9 % (FLUSH) 0.9 %
5-40 SYRINGE (ML) INJECTION AS NEEDED
Status: DISCONTINUED | OUTPATIENT
Start: 2023-04-30 | End: 2023-05-05 | Stop reason: HOSPADM

## 2023-04-30 RX ORDER — SODIUM CHLORIDE 0.9 % (FLUSH) 0.9 %
5-40 SYRINGE (ML) INJECTION EVERY 8 HOURS
Status: DISCONTINUED | OUTPATIENT
Start: 2023-04-30 | End: 2023-05-05 | Stop reason: HOSPADM

## 2023-04-30 RX ORDER — ONDANSETRON 2 MG/ML
4 INJECTION INTRAMUSCULAR; INTRAVENOUS
Status: DISCONTINUED | OUTPATIENT
Start: 2023-04-30 | End: 2023-05-05 | Stop reason: HOSPADM

## 2023-04-30 RX ORDER — POLYETHYLENE GLYCOL 3350 17 G/17G
17 POWDER, FOR SOLUTION ORAL DAILY PRN
Status: DISCONTINUED | OUTPATIENT
Start: 2023-04-30 | End: 2023-05-05 | Stop reason: HOSPADM

## 2023-04-30 RX ORDER — ACETAMINOPHEN 650 MG/1
650 SUPPOSITORY RECTAL
Status: DISCONTINUED | OUTPATIENT
Start: 2023-04-30 | End: 2023-05-05 | Stop reason: HOSPADM

## 2023-04-30 RX ORDER — ACETAMINOPHEN 325 MG/1
650 TABLET ORAL
Status: DISCONTINUED | OUTPATIENT
Start: 2023-04-30 | End: 2023-05-05 | Stop reason: HOSPADM

## 2023-04-30 RX ORDER — DOPAMINE HYDROCHLORIDE 320 MG/100ML
0-10 INJECTION, SOLUTION INTRAVENOUS
Status: DISCONTINUED | OUTPATIENT
Start: 2023-04-30 | End: 2023-05-04

## 2023-04-30 RX ORDER — ONDANSETRON 4 MG/1
4 TABLET, ORALLY DISINTEGRATING ORAL
Status: DISCONTINUED | OUTPATIENT
Start: 2023-04-30 | End: 2023-05-05 | Stop reason: HOSPADM

## 2023-04-30 RX ADMIN — SODIUM CHLORIDE 250 ML: 9 INJECTION, SOLUTION INTRAVENOUS at 22:56

## 2023-04-30 RX ADMIN — SODIUM CHLORIDE, PRESERVATIVE FREE 10 ML: 5 INJECTION INTRAVENOUS at 23:15

## 2023-04-30 RX ADMIN — DOPAMINE HYDROCHLORIDE IN DEXTROSE 5 MCG/KG/MIN: 3.2 INJECTION, SOLUTION INTRAVENOUS at 23:10

## 2023-04-30 RX ADMIN — ACETAMINOPHEN 325 MG: 325 TABLET ORAL at 23:11

## 2023-05-01 ENCOUNTER — APPOINTMENT (OUTPATIENT)
Dept: NON INVASIVE DIAGNOSTICS | Age: 88
DRG: 243 | End: 2023-05-01
Attending: INTERNAL MEDICINE
Payer: MEDICARE

## 2023-05-01 ENCOUNTER — APPOINTMENT (OUTPATIENT)
Dept: GENERAL RADIOLOGY | Age: 88
DRG: 243 | End: 2023-05-01
Attending: INTERNAL MEDICINE
Payer: MEDICARE

## 2023-05-01 LAB
ANION GAP SERPL CALC-SCNC: 3 MMOL/L (ref 5–15)
BASOPHILS # BLD: 0.1 K/UL (ref 0–0.1)
BASOPHILS NFR BLD: 1 % (ref 0–1)
BUN SERPL-MCNC: 22 MG/DL (ref 6–20)
BUN/CREAT SERPL: 22 (ref 12–20)
CALCIUM SERPL-MCNC: 8.5 MG/DL (ref 8.5–10.1)
CHLORIDE SERPL-SCNC: 104 MMOL/L (ref 97–108)
CO2 SERPL-SCNC: 29 MMOL/L (ref 21–32)
CREAT SERPL-MCNC: 0.99 MG/DL (ref 0.55–1.02)
D DIMER PPP FEU-MCNC: 0.19 MG/L FEU (ref 0–0.65)
DIFFERENTIAL METHOD BLD: NORMAL
ECHO AO ARCH DIAM: 2.8 CM
ECHO AO ASC DIAM: 3.1 CM
ECHO AO ASCENDING AORTA INDEX: 1.74 CM/M2
ECHO AV AREA PEAK VELOCITY: 1.7 CM2
ECHO AV AREA VTI: 1.7 CM2
ECHO AV AREA/BSA PEAK VELOCITY: 1 CM2/M2
ECHO AV AREA/BSA VTI: 1 CM2/M2
ECHO AV MEAN GRADIENT: 5 MMHG
ECHO AV MEAN VELOCITY: 1.1 M/S
ECHO AV PEAK GRADIENT: 9 MMHG
ECHO AV PEAK VELOCITY: 1.5 M/S
ECHO AV VELOCITY RATIO: 0.53
ECHO AV VTI: 32.7 CM
ECHO LA DIAMETER INDEX: 1.97 CM/M2
ECHO LA DIAMETER: 3.5 CM
ECHO LA VOL 2C: 44 ML (ref 22–52)
ECHO LA VOL 4C: 37 ML (ref 22–52)
ECHO LA VOL BP: 43 ML (ref 22–52)
ECHO LA VOL/BSA BIPLANE: 24 ML/M2 (ref 16–34)
ECHO LA VOLUME AREA LENGTH: 50 ML
ECHO LA VOLUME INDEX A2C: 25 ML/M2 (ref 16–34)
ECHO LA VOLUME INDEX A4C: 21 ML/M2 (ref 16–34)
ECHO LA VOLUME INDEX AREA LENGTH: 28 ML/M2 (ref 16–34)
ECHO LV E' LATERAL VELOCITY: 10 CM/S
ECHO LV E' SEPTAL VELOCITY: 5 CM/S
ECHO LV EDV A2C: 72 ML
ECHO LV EDV A4C: 69 ML
ECHO LV EDV BP: 71 ML (ref 56–104)
ECHO LV EDV INDEX A4C: 39 ML/M2
ECHO LV EDV INDEX BP: 40 ML/M2
ECHO LV EDV NDEX A2C: 40 ML/M2
ECHO LV EJECTION FRACTION A2C: 58 %
ECHO LV EJECTION FRACTION A4C: 60 %
ECHO LV EJECTION FRACTION BIPLANE: 59 % (ref 55–100)
ECHO LV ESV A2C: 31 ML
ECHO LV ESV A4C: 28 ML
ECHO LV ESV BP: 29 ML (ref 19–49)
ECHO LV ESV INDEX A2C: 17 ML/M2
ECHO LV ESV INDEX A4C: 16 ML/M2
ECHO LV ESV INDEX BP: 16 ML/M2
ECHO LV FRACTIONAL SHORTENING: 39 % (ref 28–44)
ECHO LV INTERNAL DIMENSION DIASTOLE INDEX: 2.47 CM/M2
ECHO LV INTERNAL DIMENSION DIASTOLIC: 4.4 CM (ref 3.9–5.3)
ECHO LV INTERNAL DIMENSION SYSTOLIC INDEX: 1.52 CM/M2
ECHO LV INTERNAL DIMENSION SYSTOLIC: 2.7 CM
ECHO LV IVSD: 0.8 CM (ref 0.6–0.9)
ECHO LV MASS 2D: 100.6 G (ref 67–162)
ECHO LV MASS INDEX 2D: 56.5 G/M2 (ref 43–95)
ECHO LV POSTERIOR WALL DIASTOLIC: 0.7 CM (ref 0.6–0.9)
ECHO LV RELATIVE WALL THICKNESS RATIO: 0.32
ECHO LVOT AREA: 3.5 CM2
ECHO LVOT AV VTI INDEX: 0.5
ECHO LVOT DIAM: 2.1 CM
ECHO LVOT MEAN GRADIENT: 1 MMHG
ECHO LVOT PEAK GRADIENT: 2 MMHG
ECHO LVOT PEAK VELOCITY: 0.8 M/S
ECHO LVOT STROKE VOLUME INDEX: 31.5 ML/M2
ECHO LVOT SV: 56.1 ML
ECHO LVOT VTI: 16.2 CM
ECHO MV A VELOCITY: 0.51 M/S
ECHO MV E DECELERATION TIME (DT): 312.7 MS
ECHO MV E VELOCITY: 1.07 M/S
ECHO MV E/A RATIO: 2.1
ECHO MV E/E' LATERAL: 10.7
ECHO MV E/E' RATIO (AVERAGED): 16.05
ECHO MV E/E' SEPTAL: 21.4
ECHO MV REGURGITANT PEAK GRADIENT: 108 MMHG
ECHO MV REGURGITANT PEAK VELOCITY: 5.2 M/S
ECHO PULMONARY ARTERY END DIASTOLIC PRESSURE: 4 MMHG
ECHO PV MAX VELOCITY: 1.1 M/S
ECHO PV PEAK GRADIENT: 4 MMHG
ECHO PV REGURGITANT MAX VELOCITY: 1.1 M/S
ECHO RV FREE WALL PEAK S': 21 CM/S
ECHO RV INTERNAL DIMENSION: 3.8 CM
ECHO RV TAPSE: 2.5 CM (ref 1.7–?)
ECHO TV REGURGITANT MAX VELOCITY: 2.68 M/S
ECHO TV REGURGITANT PEAK GRADIENT: 29 MMHG
EOSINOPHIL # BLD: 0.2 K/UL (ref 0–0.4)
EOSINOPHIL NFR BLD: 3 % (ref 0–7)
ERYTHROCYTE [DISTWIDTH] IN BLOOD BY AUTOMATED COUNT: 13.9 % (ref 11.5–14.5)
GLUCOSE SERPL-MCNC: 119 MG/DL (ref 65–100)
HCT VFR BLD AUTO: 43.9 % (ref 35–47)
HGB BLD-MCNC: 14.2 G/DL (ref 11.5–16)
IMM GRANULOCYTES # BLD AUTO: 0 K/UL (ref 0–0.04)
IMM GRANULOCYTES NFR BLD AUTO: 0 % (ref 0–0.5)
LYMPHOCYTES # BLD: 1.8 K/UL (ref 0.8–3.5)
LYMPHOCYTES NFR BLD: 28 % (ref 12–49)
MCH RBC QN AUTO: 30.3 PG (ref 26–34)
MCHC RBC AUTO-ENTMCNC: 32.3 G/DL (ref 30–36.5)
MCV RBC AUTO: 93.6 FL (ref 80–99)
MONOCYTES # BLD: 0.7 K/UL (ref 0–1)
MONOCYTES NFR BLD: 11 % (ref 5–13)
NEUTS SEG # BLD: 3.8 K/UL (ref 1.8–8)
NEUTS SEG NFR BLD: 58 % (ref 32–75)
NRBC # BLD: 0 K/UL (ref 0–0.01)
NRBC BLD-RTO: 0 PER 100 WBC
PLATELET # BLD AUTO: 253 K/UL (ref 150–400)
PMV BLD AUTO: 10.9 FL (ref 8.9–12.9)
POTASSIUM SERPL-SCNC: 4.7 MMOL/L (ref 3.5–5.1)
RBC # BLD AUTO: 4.69 M/UL (ref 3.8–5.2)
SODIUM SERPL-SCNC: 136 MMOL/L (ref 136–145)
WBC # BLD AUTO: 6.5 K/UL (ref 3.6–11)

## 2023-05-01 PROCEDURE — 97116 GAIT TRAINING THERAPY: CPT

## 2023-05-01 PROCEDURE — 74011250636 HC RX REV CODE- 250/636: Performed by: INTERNAL MEDICINE

## 2023-05-01 PROCEDURE — 36415 COLL VENOUS BLD VENIPUNCTURE: CPT

## 2023-05-01 PROCEDURE — 65610000006 HC RM INTENSIVE CARE

## 2023-05-01 PROCEDURE — 97165 OT EVAL LOW COMPLEX 30 MIN: CPT

## 2023-05-01 PROCEDURE — 74011250637 HC RX REV CODE- 250/637: Performed by: INTERNAL MEDICINE

## 2023-05-01 PROCEDURE — 85379 FIBRIN DEGRADATION QUANT: CPT

## 2023-05-01 PROCEDURE — 93306 TTE W/DOPPLER COMPLETE: CPT

## 2023-05-01 PROCEDURE — 85025 COMPLETE CBC W/AUTO DIFF WBC: CPT

## 2023-05-01 PROCEDURE — 77030040361 HC SLV COMPR DVT MDII -B

## 2023-05-01 PROCEDURE — 97535 SELF CARE MNGMENT TRAINING: CPT

## 2023-05-01 PROCEDURE — 74011000250 HC RX REV CODE- 250: Performed by: INTERNAL MEDICINE

## 2023-05-01 PROCEDURE — 97161 PT EVAL LOW COMPLEX 20 MIN: CPT

## 2023-05-01 PROCEDURE — 80048 BASIC METABOLIC PNL TOTAL CA: CPT

## 2023-05-01 PROCEDURE — 74018 RADEX ABDOMEN 1 VIEW: CPT

## 2023-05-01 PROCEDURE — 97530 THERAPEUTIC ACTIVITIES: CPT

## 2023-05-01 PROCEDURE — 99223 1ST HOSP IP/OBS HIGH 75: CPT | Performed by: INTERNAL MEDICINE

## 2023-05-01 PROCEDURE — 93306 TTE W/DOPPLER COMPLETE: CPT | Performed by: SPECIALIST

## 2023-05-01 RX ORDER — METOPROLOL SUCCINATE 25 MG/1
25 TABLET, EXTENDED RELEASE ORAL DAILY
COMMUNITY
End: 2023-05-05

## 2023-05-01 RX ORDER — AMLODIPINE BESYLATE 5 MG/1
10 TABLET ORAL DAILY
Status: DISCONTINUED | OUTPATIENT
Start: 2023-05-01 | End: 2023-05-01

## 2023-05-01 RX ORDER — SPIRONOLACTONE 25 MG/1
50 TABLET ORAL
Status: DISCONTINUED | OUTPATIENT
Start: 2023-05-02 | End: 2023-05-05 | Stop reason: HOSPADM

## 2023-05-01 RX ORDER — ASCORBIC ACID 500 MG
500 TABLET ORAL DAILY
Status: DISCONTINUED | OUTPATIENT
Start: 2023-05-02 | End: 2023-05-05 | Stop reason: HOSPADM

## 2023-05-01 RX ORDER — GLUCOSAMINE/CHONDR SU A SOD 750-600 MG
2 TABLET ORAL DAILY
COMMUNITY
End: 2023-05-05

## 2023-05-01 RX ORDER — CHOLECALCIFEROL (VITAMIN D3) 125 MCG
5 CAPSULE ORAL
COMMUNITY

## 2023-05-01 RX ORDER — LEVOTHYROXINE SODIUM 75 UG/1
150 TABLET ORAL
Status: DISCONTINUED | OUTPATIENT
Start: 2023-05-01 | End: 2023-05-01

## 2023-05-01 RX ORDER — PRAVASTATIN SODIUM 20 MG/1
10 TABLET ORAL EVERY OTHER DAY
Status: DISCONTINUED | OUTPATIENT
Start: 2023-05-01 | End: 2023-05-05 | Stop reason: HOSPADM

## 2023-05-01 RX ORDER — HYDRALAZINE HYDROCHLORIDE 20 MG/ML
10 INJECTION INTRAMUSCULAR; INTRAVENOUS
Status: DISCONTINUED | OUTPATIENT
Start: 2023-05-01 | End: 2023-05-05 | Stop reason: HOSPADM

## 2023-05-01 RX ORDER — FUROSEMIDE 20 MG/1
40 TABLET ORAL EVERY MORNING
COMMUNITY
End: 2023-05-05

## 2023-05-01 RX ORDER — PRAVASTATIN SODIUM 10 MG/1
10 TABLET ORAL EVERY OTHER DAY
COMMUNITY
End: 2023-05-05

## 2023-05-01 RX ORDER — MEMANTINE HYDROCHLORIDE 10 MG/1
5 TABLET ORAL 2 TIMES DAILY
Status: DISCONTINUED | OUTPATIENT
Start: 2023-05-01 | End: 2023-05-05 | Stop reason: HOSPADM

## 2023-05-01 RX ORDER — SPIRONOLACTONE 25 MG/1
50 TABLET ORAL
COMMUNITY
End: 2023-05-05

## 2023-05-01 RX ADMIN — SODIUM CHLORIDE, PRESERVATIVE FREE 10 ML: 5 INJECTION INTRAVENOUS at 13:50

## 2023-05-01 RX ADMIN — PRAVASTATIN SODIUM 10 MG: 20 TABLET ORAL at 18:34

## 2023-05-01 RX ADMIN — LEVOTHYROXINE SODIUM 150 MCG: 0.07 TABLET ORAL at 05:38

## 2023-05-01 RX ADMIN — ONDANSETRON 4 MG: 2 INJECTION INTRAMUSCULAR; INTRAVENOUS at 00:18

## 2023-05-01 RX ADMIN — MEMANTINE 5 MG: 10 TABLET ORAL at 18:36

## 2023-05-01 RX ADMIN — ACETAMINOPHEN 650 MG: 325 TABLET ORAL at 10:08

## 2023-05-01 RX ADMIN — ACETAMINOPHEN 650 MG: 325 TABLET ORAL at 20:48

## 2023-05-01 NOTE — H&P
[facility]  [address] [facility], [address] [place] [address]  [phone]    Admission History and Physical      NAME:  Dg Chandler   :   1934   MRN:  [id number]     PCP:  Mayco Can MD     Date/Time of service:  2023          Subjective:     CHIEF COMPLAINT: Dyspnea, fatigue    HISTORY OF PRESENT ILLNESS:     Ms. Mari Garcia is a 80 y.o.  female with a past medical history of A-fib, sick sinus syndrome, mitral regurg, subdural hematoma, hypothyroidism, degenerative joint disease who is admitted with dyspnea, fatigue. Ms. Mari Garcia states that over the past 2 weeks she has had increasing dyspnea and fatigue. She does also endorse increasing lightheadedness over the last 2 weeks. She does note a fall recently; when asked if she could have possibly had a syncopal episode related to the fall she stated she was unsure. She denies any abdominal pain nausea vomiting or burning with urination. Allergies   Allergen Reactions    Augmentin [Amoxicillin-Pot Clavulanate] Nausea and Vomiting    Bactrim [Sulfamethoprim Ds] Other (comments)     Palpitations, diarrhea    Ciprofloxacin Unknown (comments)    Codeine Unknown (comments)    Hydrocodone Unknown (comments)    Levaquin [Levofloxacin] Rash    Melatonin Other (comments)     nightmares    Morphine Unknown (comments)    Pataday [Olopatadine] Other (comments)     Eye itching        Prior to Admission medications    Medication Sig Start Date End Date Taking? Authorizing Provider   amLODIPine (NORVASC) 5 mg tablet Take 5 mg by mouth daily. Provider, Historical   levothyroxine (SYNTHROID) 125 mcg tablet 1 tablet daily 22   Laura Patel IV, MD   pravastatin (PRAVACHOL) 10 mg tablet 1 tablet daily 22   Laura Patel IV, MD   spironolactone (ALDACTONE) 25 mg tablet 1Tablet QD  Patient taking differently: Take 50 mg by mouth daily.  1Tablet QD 22   Laura Patel IV, MD   memantine (NAMENDA) 5 mg tablet Take 1 Tablet by mouth two (2) times a day. 11/2/22   Irene Jimenez IV, MD   potassium chloride SR (Klor-Con 10) 10 mEq tablet Take 1 Tablet by mouth daily. 11/2/22   Irene Jimenez IV, MD   amiodarone (CORDARONE) 200 mg tablet 1 tablet daily 11/2/22   Irene Jimenez IV, MD   furosemide (Lasix) 20 mg tablet 40 mg q am ; and 20 mg q pm  Patient taking differently: Take 40 mg by mouth daily. 40 mg q am 11/2/22   Irene Jimenez IV, MD   rivaroxaban [employer]) 20 mg tab tablet Take 1 Tablet by mouth daily (with dinner). 8/11/22   Irene Jimenez IV, MD   OTHER Glucosamine  And Chondroitin ; Melatonin 5 mg qhs ; Tylenol 325 -650 mg qhs ; Vit C 1000 mg daily . Provider, Historical   ALPRAZolam (XANAX) 0.5 mg tablet TAKE 1 TABLET BY MOUTH IN THE EVENING AS NEEDED FOR ANXIETY 1/5/21   Irene Jimenez IV, MD   acetaminophen (TYLENOL) 325 mg tablet Take 325 mg by mouth nightly. Provider, Historical   magnesium chloride (MAG DELAY) 64 mg delayed release tablet Take 128 mg by mouth daily. Provider, Historical   polyethylene glycol (MIRALAX) 17 gram/dose powder Take 17 g by mouth daily as needed. Provider, Historical   calcium carbonate/vitamin D3 (CALTRATE WITH VITAMIN D3 PO) Take 600 mg by mouth daily. Has D3 800 units also    Provider, Historical   LORATADINE PO Take 10 mg by mouth daily. Provider, Historical   ascorbic acid (VITAMIN C) 500 mg tablet Take 500 mg by mouth daily. Provider, Historical       Past Medical History:   Diagnosis Date    A-fib ([facility] [address]     Allergic rhinitis 3/27/2012    Angular blepharoconjunctivitis of both eyes 5/30/2012    Anxiety     Atypical chest pain 8/11/2011    Back pain     Cancer ([facility] [address]     skin carcinoma excision    Constipation     Dermatochalasis     DJD (degenerative joint disease) 8/11/2011    Hepatitis     History of CVA (cerebrovascular accident) 5/21/2019    May 2019 Head CT . Chronic Right Parietal CVA.      HTN (hypertension) 8/11/2011    HX OTHER MEDICAL 2009    Cardiac cath negative , in [place]    Hypothyroidism 8/11/2011    IBS (irritable bowel syndrome) 8/11/2011    Mitral regurgitation     Onychomycosis     Osteopenia     PAC (premature atrial contraction)     Rectocele     Stress     Stroke ([facility] [address]     Subdural hematoma 5/15/2020    5/13/2020 GLF. Right Parietal SDH. eval'd by [facility]     Type 2 diabetes mellitus without complication ([facility] [address]     Viral hepatitis     Weakness     Zoster         Past Surgical History:   Procedure Laterality Date    ENDOSCOPY, COLON, DIAGNOSTIC  2010    diverticulosis, dr. Govind Estes, f/u     HX APPENDECTOMY      HX COLONOSCOPY      HX GYN      D and C, BTL, Hysterectomy('76)    HX OTHER SURGICAL      tonsilectomy    HX OTHER SURGICAL      eyelid sgy     HX TONSILLECTOMY      [place] BREAST SURGERY PROCEDURE UNLISTED      rt breast bx neg.     VASCULAR SURGERY PROCEDURE UNLIST      vein stripping       Social History     Tobacco Use    Smoking status: Never    Smokeless tobacco: Never   Substance Use Topics    Alcohol use: No     Comment: no alcohol for 2 months, since afib diagnosis        Family History   Problem Relation Age of Onset    Diabetes Father     Heart Attack Father     Hypertension Father     Stroke Mother     Heart Attack Mother         CABG    Hypertension Mother     Breast Cancer Sister     Diabetes Sister     Hypertension Sister     No Known Problems Brother     No Known Problems Maternal Grandmother     No Known Problems Maternal Grandfather     No Known Problems Paternal Grandmother     No Known Problems Paternal Grandfather     No Known Problems Other     Cancer Son         bladder cancer    Diabetes Son     Diabetes Sister     Hypertension Sister     Diabetes Sister     Hypertension Sister     Heart Attack Sister         Review of Systems:  Per HPI         Objective:      VITALS:    Vital signs reviewed; most recent are:    Visit Vitals  BP (!) 101/51   Pulse (!) 34   Temp 98.2 °F (36.8 °C)   Resp 17   Ht 5' 2\" (1.575 m)   Wt 77.1 kg (170 lb)   SpO2 95%   BMI 31.09 kg/m²     SpO2 Readings from Last 6 Encounters:   04/30/23 95%   06/19/19 91%   08/30/18 93%   07/16/18 96%   07/13/18 94%   06/25/18 97%        No intake or output data in the 24 hours ending 04/30/23 5461     Exam:     Physical Exam:    Gen:  elderly, in no acute distress  HEENT:  Pink conjunctivae, PERRL, hearing intact to voice  Resp:  No accessory muscle use, clear breath sounds without wheezes rales or rhonchi  Card:  RRR, No murmurs, normal S1, S2, no peripheral edema  Abd:  Soft, non-tender, non-distended, normoactive bowel sounds are present  Musc:  No cyanosis or clubbing  Skin:  No rashes or ulcers, skin turgor is good  Neuro:  Cranial nerves 3-12 are grossly intact, follows commands appropriately, weakness   Psych:  Oriented to person, place, and time, Alert with good insight      Labs:    Recent Labs     04/30/23  1844   WBC 7.1   HGB 13.8   HCT 42.4        Recent Labs     04/30/23  1844   *   K 5.0      CO2 30   *   BUN 27*   CREA 1.21*   CA 8.6   MG 2.0   ALB 3.2*   TBILI 0.2   ALT 31     Lab Results   Component Value Date/Time    Glucose POC 92 02/21/2019 03:28 PM    Glucose  (A) 12/20/2018 04:28 PM     No results for input(s): PH, PCO2, PO2, HCO3, FIO2 in the last 72 hours. Recent Labs     04/30/23  1844   INR 1.4*       Radiology and EKG reviewed:   Chest x-ray no acute concerns     Old Records reviewed in [facility]       Assessment/Plan:       Bradycardia/history of atrial fibrillation/history of sick sinus syndrome/ Mitral regurg POA: No signs of heart degree block. However appears symptomatic with lightheadedness and questionable syncopal episode recently. Check TSH. Trop wnl; densies CP. Check echo. Verify home meds; hold any rate controlling agents including home amiodarone and amlodipine. Hold anticoagulation for now in case cardiac procedures pursued. Admit to ICU. Consult cardiology -discussed with on-call cardiology tonight he does agree with starting low-dose dopamine drip. Consult EP. Consult intensivist.    Dyspnea POA: Unclear if patient is on anticoagulation given history of subdural hematoma. Chest x-ray with no volume overload; proBNP is elevated. Check D-dimer. Low threshold to check respiratory viral panel if any fevers develop. For now hold off on any diuretic given low BP. Hypotension / hypertension: Hold home anti hypertensives given hypotension. Hold home diuretics. Gentle boluses as needed. Will start dopamine as noted above. Hyperlipidemia: Verify home meds. Hypothyroidism: Check TSH. May need up titration and levothyroxine. Anxiety: Verify home meds. Dementia?:  Mild because appears oriented x3 with good insight. Verify home meds. Fatigue/falls POA: Suspect due to above. Check CT head. PT OT. Risk of deterioration: high      Total time spent with patient: 36 Minutes **I personally saw and examined the patient during this time period**  I personally spent 40 minutes in providing critical care. The reason for providing this level of medical care for this critically ill patient was due to a critical illness including bradycardia with hypotension that impaired one or more vital organ systems such that there was a high probability of imminent or life threatening deterioration in the patient's condition. This care involved high complexity decision making to assess, manipulate, and support vital system functions.                   Care Plan discussed with: Patient, Nursing Staff, and Consultant/Specialist    Discussed:  Care Plan    Prophylaxis:  SCD's    Probable Disposition: Return to assisted living with possible home health versus SNF           ___________________________________________________    Attending Physician: Juana Lucero,

## 2023-05-01 NOTE — PROGRESS NOTES
[facility] Dosing Services: 05/01/23  Xarelto dose change per renal protocol  Physician Dr Alex Haas  Indication: Afib  Previous Regimen Xarelto 20 mg po daily   Serum Creatinine Lab Results   Component Value Date/Time    Creatinine 0.99 05/01/2023 12:02 AM      Creatinine Clearance Estimated Creatinine Clearance: 37 mL/min (based on SCr of 0.99 mg/dL).    BUN Lab Results   Component Value Date/Time    BUN 22 (H) 05/01/2023 12:02 AM       Plan: Changed Xarelto to 15 mg daily    Thank you    Michael Hawkins, PharmD  [phone]

## 2023-05-01 NOTE — PROGRESS NOTES
Problem: Mobility Impaired (Adult and Pediatric)  Goal: *Acute Goals and Plan of Care (Insert Text)  Description: FUNCTIONAL STATUS PRIOR TO ADMISSION: Patient was modified independent using a rollator for functional mobility. HOME SUPPORT PRIOR TO ADMISSION: The patient lived alone with family to provide assistance. Physical Therapy Goals  Initiated 5/1/2023  1. Patient will move from supine to sit and sit to supine , scoot up and down, and roll side to side in bed with independence within 7 day(s). 2.  Patient will transfer from bed to chair and chair to bed with modified independence using the least restrictive device within 7 day(s). 3.  Patient will perform sit to stand with modified independence within 7 day(s). 4.  Patient will ambulate with modified independence for 200 feet with the least restrictive device within 7 day(s). Outcome: Progressing Towards Goal   PHYSICAL THERAPY EVALUATION  Patient: Jason Giron (28 y.o. female)  Date: 5/1/2023  Primary Diagnosis: Dyspnea [R06.00]       Precautions: fall       ASSESSMENT  Based on the objective data described below, the patient presents with generalized weakness. Communicated with nurse cleared for therapy watch out for low HR and BP. Patient supine on bed when received. Rolled on the edge of bed min assist, supine to sit , min assist, sit to stand min assist, ambulate with rolling walker min assist to and form the commode, ambulate to the recliner min assist  decreased pace no loss of balance. Vitals remain stable  during and after therapy. OOB to chair as tolerated performed some active range of motion exercise on both LE all planes. Educate and instructed patient to continue active range of motion exercise on both legs while up on chair or on bed multiple times. Recommend patient to be up on the chair at least 3 times a day every meal times as tolerated. Communicated with nurse who agreed to monitor patient.     Current Level of Function Impacting Discharge (mobility/balance): min assist with transfers and ambulation using a rolling walker    Functional Outcome Measure: The patient scored 18/24 on the [facility] outcome measure. Other factors to consider for discharge: fall     Patient will benefit from skilled therapy intervention to address the above noted impairments. PLAN :  Recommendations and Planned Interventions: bed mobility training, transfer training, gait training, therapeutic exercises, neuromuscular re-education, orthotic/prosthetic training, patient and family training/education, and therapeutic activities      Frequency/Duration: Patient will be followed by physical therapy:  5 times a week to address goals. Recommendation for discharge: (in order for the patient to meet his/her long term goals)  Therapy up to 5 days/week in rehab setting vs snf to be  determine pending progress from therapy    This discharge recommendation:  Has been made in collaboration with the attending provider and/or case management    IF patient discharges home will need the following DME: patient owns DME required for discharge         SUBJECTIVE:   Patient stated [address]     OBJECTIVE DATA SUMMARY:   HISTORY:    Past Medical History:   Diagnosis Date    A-fib ([facility] [address]     Allergic rhinitis 3/27/2012    Angular blepharoconjunctivitis of both eyes 5/30/2012    Anxiety     Atypical chest pain 8/11/2011    Back pain     Cancer ([facility] [address]     skin carcinoma excision    Constipation     Dermatochalasis     DJD (degenerative joint disease) 8/11/2011    Hepatitis     History of CVA (cerebrovascular accident) 5/21/2019    May 2019 Head CT . Chronic Right Parietal CVA.      HTN (hypertension) 8/11/2011    HX OTHER MEDICAL 2009    Cardiac cath negative , in [place]    Hypothyroidism 8/11/2011    IBS (irritable bowel syndrome) 8/11/2011    Mitral regurgitation     Onychomycosis     Osteopenia     PAC (premature atrial contraction)     Rectocele     Stress Stroke [facility])     Subdural hematoma 5/15/2020    5/13/2020 GLF. Right Parietal SDH. eval'd by N[facility]     Type 2 diabetes mellitus without complication ([facility] [address]     Viral hepatitis     Weakness     Zoster      Past Surgical History:   Procedure Laterality Date    ENDOSCOPY, COLON, DIAGNOSTIC  2010    diverticulosis, dr. Shyam Sauceda, f/u     HX APPENDECTOMY      HX COLONOSCOPY      HX GYN      D and C, BTL, Hysterectomy('76)    HX OTHER SURGICAL      tonsilectomy    HX OTHER SURGICAL      eyelid sgy     HX TONSILLECTOMY      OK BREAST SURGERY PROCEDURE UNLISTED      rt breast bx neg. VASCULAR SURGERY PROCEDURE UNLIST      vein stripping       Personal factors and/or comorbidities impacting plan of care:     Home Situation  Home Environment: Independent living ([employer] [place])  One/Two Story Residence: One story  Living Alone: Yes  Support Systems: Child(zander)  Patient Expects to be Discharged to[de-identified] Home with home health  Current DME Used/Available at Home: Janine Floyd, rollator, [address] chair, Grab bars  Tub or Shower Type: Shower    EXAMINATION/PRESENTATION/DECISION MAKING:   Critical Behavior:  Neurologic State: Alert, Eyes open spontaneously  Orientation Level: Oriented X4  Cognition: Follows commands, Appropriate for age attention/concentration  Safety/Judgement: Awareness of environment  Hearing: Auditory  Auditory Impairment: Hard of hearing, bilateral  Hearing Aids/Status: Bilateral, With patient    Range Of Motion:  AROM: Within functional limits           PROM: Within functional limits           Strength:    Strength:  Within functional limits                    Tone & Sensation:                                  Coordination:  Coordination: Within functional limits  Vision:   Tracking: Able to track stimulus in all quadrants w/o difficulty  Diplopia: No  Acuity: Within Defined Limits  Functional Mobility:  Bed Mobility:  Rolling: Minimum assistance  Supine to Sit: Minimum assistance  Sit to Supine: Minimum assistance  Scooting: Minimum assistance  Transfers:  Sit to Stand: Minimum assistance  Stand to Sit: Minimum assistance  Stand Pivot Transfers: Minimum assistance     Bed to Chair: Minimum assistance              Balance:   Sitting: Intact; High guard  Standing: Impaired; With support  Standing - Static: Fair  Standing - Dynamic : Fair  Ambulation/Gait Training:  Distance (ft): 10 Feet (ft)  Assistive Device: Walker, rolling;Gait belt  Ambulation - Level of Assistance: Minimal assistance     Gait Description (WDL): Exceptions to WDL  Gait Abnormalities: Path deviations; Step to gait        Base of Support: Widened     Speed/Velia: Pace decreased (<100 feet/min)  Step Length: Right shortened;Left shortened                   Therapeutic Exercises:   Educate and instructed patient to continue active range of motion exercise on both legs while up on chair or on bed multiple times. Recommend patient to be up on the chair at least 3 times a day every meal times as tolerated. Functional Measure:  [facility] AM-PAC®      Basic Mobility Inpatient Short Form (6-Clicks) Version 2  How much HELP from another person do you currently need. .. (If the patient hasn't done an activity recently, how much help from another person do you think they would need if they tried?) Total A Lot A Little None   1. Turning from your back to your side while in a flat bed without using bedrails? []  1 []  2 [x]  3  []  4   2. Moving from lying on your back to sitting on the side of a flat bed without using bedrails? []  1 []  2 [x]  3  []  4   3. Moving to and from a bed to a chair (including a wheelchair)? []  1 []  2 [x]  3  []  4   4. Standing up from a chair using your arms (e.g. wheelchair or bedside chair)? []  1 []  2 [x]  3  []  4   5. Walking in hospital room? []  1 []  2 [x]  3  []  4   6. Climbing 3-5 steps with a railing?  []  1 []  2 [x]  3  []  4     Raw Score: 18/24                            Cutoff score ?171,2,3 had higher odds of discharging home with home health or need of SNF/IPR. [address], Kaylen Dasha Hutton, Camacho Ayala. Validity of the AM-PAC 6-Clicks Inpatient Daily Activity and Basic Mobility Short Forms. Physical Therapy Mar 2014, [phone]) 379-391; DOI: 10.2522/ptj.91535257  2. Sofia Anibalcash. Association of AM-PAC \"6-Clicks\" Basic Mobility and Daily Activity Scores With Discharge Destination. Phys Ther. 2021 4;101(4):dord983. doi: 10.1093/ptj/lkrq300. PMID: 84553764. V Wicho Taylor, Lilly D, Rob Schilder, Althea ARVIZU, Yannick S. Activity Measure for Post-Acute Care \"6-Clicks\" Basic Mobility Scores Predict Discharge Destination After Acute Care Hospitalization in Select Patient Groups: A Retrospective, Observational Study. Arch Rehabil Res Clin Transl. 2022 16;4(3):541782. doi: 10.1016/j.arrct. 7882.180098. PMID: 53648444; PMCID: [id number]. 4. Gladys Swanson, Mookie W, Lyndsey P. AM-PAC Short Forms Manual 4.0. Revised 2020.         Physical Therapy Evaluation Charge Determination   History Examination Presentation Decision-Making   LOW Complexity : Zero comorbidities / personal factors that will impact the outcome / POC LOW Complexity : 1-2 Standardized tests and measures addressing body structure, function, activity limitation and / or participation in recreation  LOW Complexity : Stable, uncomplicated  Other outcome measures ampa  LOW       Based on the above components, the patient evaluation is determined to be of the following complexity level: LOW     Pain Ratin/10    Activity Tolerance:   Good    After treatment patient left in no apparent distress:   Sitting in chair, Heels elevated for pressure relief, Call bell within reach, and Bed / chair alarm activated    COMMUNICATION/EDUCATION:   The patients plan of care was discussed with: Occupational therapist, Registered nurse, Physician, and Case management. Fall prevention education was provided and the patient/caregiver indicated understanding. Thank you for this referral.  Damien Davis PT,WCC.    Time Calculation: 41 mins

## 2023-05-01 NOTE — PROGRESS NOTES
Problem: Pressure Injury - Risk of  Goal: *Prevention of pressure injury  Description: Document John Scale and appropriate interventions in the flowsheet. Outcome: Progressing Towards Goal  Note: Pressure Injury Interventions: Activity Interventions: Assess need for specialty bed, Increase time out of bed, Pressure redistribution bed/mattress(bed type), PT/OT evaluation    Mobility Interventions: Assess need for specialty bed, HOB 30 degrees or less, Pressure redistribution bed/mattress (bed type), PT/OT evaluation, Turn and reposition approx.  every two hours(pillow and wedges)    Nutrition Interventions: Document food/fluid/supplement intake    Friction and Shear Interventions: Apply protective barrier, creams and emollients, Foam dressings/transparent film/skin sealants, HOB 30 degrees or less, Lift sheet, Lift team/patient mobility team, Minimize layers, Transferring/repositioning devices                Problem: Patient Education: Go to Patient Education Activity  Goal: Patient/Family Education  Outcome: Progressing Towards Goal

## 2023-05-01 NOTE — PROGRESS NOTES
Wili Marie siliva [address]  [address] [facility], [place], [address] [place] [address]  [phone]      Hospitalist Progress Note      NAME: Nesha Gagnon   :  1934  MRM:  [id number]    Date of service: 2023  11:05 AM       Assessment and Plan:   Bradycardia/Hx of atrial fibrillation/Hx of SSS: elevated TSH. Trop wnl. . Check echo. avoid AV blocking agents. Hold anticoagulation for now in case cardiac procedures pursued. Consult cardiology. On low-dose dopamine drip. 2.  Dyspnea: not hypoxic. Chest x-ray with no volume overload; proBNP is elevated. Normal D-dimer. Monitor clinically. Check echo     3. Hypotension / Hx of hypertension: resolved. Hold home anti hypertensive meds. Hold home diuretics. On dopamine as noted above. 4.  Hyperlipidemia: cont home meds. 5.  Hypothyroidism: Elevated TSH. increased levothyroxine. 6.  Anxiety: resume home meds once home meds reconciled       7. Fatigue/falls:  PT OT. Subjective:     Chief Complaint[de-identified] Patient was seen and examined as a follow up for bradycardia. Chart was reviewed. c/o fatigue     ROS:  (bold if positive, if negative)    Tolerating PT  Tolerating Diet        Objective:     Last 24hrs VS reviewed since prior progress note.  Most recent are:    Visit Vitals  /73   Pulse (!) 55   Temp 97.7 °F (36.5 °C)   Resp 17   Ht 5' 2\" (1.575 m)   Wt 77.1 kg (170 lb)   SpO2 94%   BMI 31.09 kg/m²     SpO2 Readings from Last 6 Encounters:   23 94%   19 91%   18 93%   18 96%   18 94%   18 97%    O2 Flow Rate (L/min): 2 l/min     Intake/Output Summary (Last 24 hours) at 2023 1105  Last data filed at 2023 0800  Gross per 24 hour   Intake 70.02 ml   Output 800 ml   Net -729.98 ml        Physical Exam:    Gen:  Well-developed, well-nourished, in no acute distress  HEENT:  Pink conjunctivae, PERRL, hearing intact to voice, moist mucous membranes  Neck:  Supple, without masses, thyroid non-tender  Resp:  No accessory muscle use, clear breath sounds without wheezes rales or rhonchi  Card:  No murmurs, normal S1, S2 without thrills, bruits or peripheral edema  Abd:  Soft, non-tender, non-distended, normoactive bowel sounds are present, no palpable organomegaly and no detectable hernias  Lymph:  No cervical or inguinal adenopathy  Musc:  No cyanosis or clubbing  Skin:  No rashes or ulcers, skin turgor is good  Neuro:  Cranial nerves are grossly intact, no focal motor weakness, follows commands appropriately  Psych:  Good insight, oriented to person, place and time, alert  __________________________________________________________________  Medications Reviewed: (see below)  Medications:     Current Facility-Administered Medications   Medication Dose Route Frequency    hydrALAZINE (APRESOLINE) 20 mg/mL injection 10 mg  10 mg IntraVENous Q6H PRN    levothyroxine (SYNTHROID) tablet 150 mcg  150 mcg Oral 6am    sodium chloride (NS) flush 5-40 mL  5-40 mL IntraVENous Q8H    sodium chloride (NS) flush 5-40 mL  5-40 mL IntraVENous PRN    acetaminophen (TYLENOL) tablet 650 mg  650 mg Oral Q6H PRN    Or    acetaminophen (TYLENOL) suppository 650 mg  650 mg Rectal Q6H PRN    polyethylene glycol (MIRALAX) packet 17 g  17 g Oral DAILY PRN    ondansetron (ZOFRAN ODT) tablet 4 mg  4 mg Oral Q8H PRN    Or    ondansetron (ZOFRAN) injection 4 mg  4 mg IntraVENous Q6H PRN    DOPamine (INTROPIN) 800 mg in dextrose 5% 250 mL infusion  0-10 mcg/kg/min IntraVENous TITRATE        Lab Data Reviewed: (see below)  Lab Review:     Recent Labs     05/01/23  0002 04/30/23  1844   WBC 6.5 7.1   HGB 14.2 13.8   HCT 43.9 42.4    286     Recent Labs     05/01/23  0002 04/30/23  1844    134*   K 4.7 5.0    101   CO2 29 30   * 107*   BUN 22* 27*   CREA 0.99 1.21*   CA 8.5 8.6   MG  --  2.0   ALB  --  3.2*   TBILI  --  0.2   ALT  --  31   INR  --  1.4*     Lab Results   Component Value Date/Time Glucose POC 92 02/21/2019 03:28 PM    Glucose  (A) 12/20/2018 04:28 PM    Glucose  (A) 10/13/2011 11:21 AM    Glucose  (A) 08/11/2011 10:12 AM     No results for input(s): PH, PCO2, PO2, HCO3, FIO2 in the last 72 hours. Recent Labs     04/30/23  1844   INR 1.4*     All Micro Results       None            I have reviewed notes of prior 24hr. Other pertinent lab: Total time: -35- minutes **I personally saw and examined the patient during this time period**    I personally reviewed chart, notes, data and current medications in the medical record. I have personally examined and treated the patient at bedside during this period.                  Care Plan discussed with: Patient, Nursing Staff, and >50% of time spent in counseling and coordination of care    Discussed:  Care Plan    Prophylaxis:  SCD's    Disposition:  Home w/Family           ___________________________________________________    Attending Physician: Martine Marcus MD

## 2023-05-01 NOTE — PROGRESS NOTES
0000- TRANSFER - IN REPORT:    Verbal report received from Zoya(name) on Sindy Flores  being received from ED(unit) for routine progression of care      Report consisted of patients Situation, Background, Assessment and   Recommendations(SBAR). Information from the following report(s) SBAR, Kardex, Intake/Output, MAR, Recent Results, Cardiac Rhythm sinus kourtney, 1st degree block, junctional, Alarm Parameters , and Quality Measures was reviewed with the receiving nurse. Opportunity for questions and clarification was provided. Assessment completed upon patients arrival to unit and care assumed. See flowsheets for all assessments. See MAR for all medication administrations. 0015- Notified  of pt new onset of nausea. PRN Zofran administered. No new orders. 0700-Bedside and Verbal shift change report given to [address] (oncoming nurse) by Yrn Deleon (offgoing nurse). Report included the following information SBAR, Kardex, Intake/Output, MAR, Recent Results, Cardiac Rhythm sinus kourtney, 1st degree block, Alarm Parameters , and Quality Measures.

## 2023-05-01 NOTE — PROGRESS NOTES
She presents with SOB and bradycardia. Review of records  indicates prior AF and SSS, she was on amiodarone. She is apparently comfortable at rest.  Stop amiodarone, even Norvasc if feasible, consider increase in Synthroid to 150 mcg, will ask team to see in a.m.

## 2023-05-01 NOTE — PROGRESS NOTES
0700: Bedside shift change report given to Burt Bamberger, MAGALI and Brett Weaver RN (oncoming nurse) by Seymour Johns RN (offgoing nurse). Report included the following information SBAR, Kardex, ED Summary, Intake/Output, MAR, Accordion, Recent Results, Med Rec Status, Cardiac Rhythm Sinus Waynetta Skeens with 1st degree block, Alarm Parameters , and Quality Measures. 0800: shift assessment complete. See flowsheet. Pacer pads in place. 1200: reassessment complete. See flowsheet. 1600: reassessment complete. See flowsheet. 1900: Bedside shift change report given to Seymour Johns RN (oncoming nurse) by Jose Elias Parker and Brett Weaver RN (offgoing nurse). Report included the following information SBAR, Kardex, ED Summary, Procedure Summary, Intake/Output, MAR, Recent Results, Med Rec Status, Cardiac Rhythm Sinus Waynetta Skeens with 1st degree block, Alarm Parameters , and Quality Measures.

## 2023-05-01 NOTE — PROGRESS NOTES
SOUND Tele  CRITICAL CARE    Tele ICU TEAM Progress Note    Name: Rod Jeffries   : 1934   MRN: [id number]   Date: 2023           ICU Assessment     Sinus bradyacdia  H/o CVA  Afibb on anticoagulation  SSS  Hypothyroidism  Anxiety            ICU Comprehensive Plan of Care:   NEURO:   # h/o CVA  # Depression?  - Pt on memantine  - Monitor Neuro exam    CV:   # Sinus Bradycardia: TTE shows EF 55%  # h/o SSS  # Afibb on anticoagulation  # MR  - Continue dopamine drip  - FU cardiology rec  - Continue aldactone  - Continue xarelto  - Continue statin  - Replace electrolytes to keep K>4, Mg>2    PULM:   # Respiratory insufficiency: related to above  - O2 as needed, to keep O2 sat>92%    GI: Stable  - NPO for possible pacemaker  - Continue ppi as below    RENAL/: Stable  - Monitor Cr and UOP    ENDO:   # Hypothyroidism  - Continue synthroid    HEME/ONC: Stable  - Monitor  - Tx Hgb < 7, Plt<10k; transfuse as needed    FEN/PPX  - No IVF maintenance  - Diet:NPO for now  - Prophylaxis: GI:   - DVT:      Code Status: Full Code  LOS: 1    Discussed Care Plan with Bedside RN       Subjective:   Progress Note: 2023      Reason for ICU Admission: bradycardia    HPI:89F with h/o CVA (no significant deficits per patient), htn, Afib (on Xarelto), SSS, hypothyroidism, and anxiety admitted for bradycardia symptomatic    : Pt on dopamine drip. Pt denies any CP, f/c, Abd pain, n/v. Pt SOB sxs is mildly better since admit. Active Problem List:     Problem List  Date Reviewed: 2022            Codes Class    Dyspnea ICD-10-CM: R06.00  ICD-9-CM: 786.09         Subdural hematoma ([facility] [address] ICD-10-CM: S06. 5XAA  ICD-9-CM: 432.1     Overview Signed 5/15/2020  7:14 AM by Macarena Haro MD     2020 GLF.  Right Parietal SDH. eval'd by Jo-Ann              History of CVA (cerebrovascular accident) ICD-10-CM: Z86.73  ICD-9-CM: V12.54     Overview Signed 2019  7:19 PM by Pretty Frausto. Colonel Zahra MD     May 2019 Head CT . Chronic Right Parietal CVA. Irregular heartbeat ICD-10-CM: I49.9  ICD-9-CM: 427.9         Hypertension complicating diabetes (HCC) ICD-10-CM: E11.59, I15.2  ICD-9-CM: 250.80, 401.9         Type 2 diabetes mellitus without complication, without long-term current use of insulin (HCC) ICD-10-CM: E11.9  ICD-9-CM: 250.00         Persistent atrial fibrillation (HCC) ICD-10-CM: I48.19  ICD-9-CM: 427.31         Non-rheumatic mitral regurgitation ICD-10-CM: I34.0  ICD-9-CM: 424.0         Acquired hypothyroidism ICD-10-CM: E03.9  ICD-9-CM: 244.9         BMI 30.0-30.9,adult ICD-10-CM: Z68.30  ICD-9-CM: V85.30         Rectocele ICD-10-CM: N81.6  ICD-9-CM: 618.04         Angular blepharoconjunctivitis of both eyes ICD-10-CM: H10.523  ICD-9-CM: 372.21         Allergic rhinitis ICD-10-CM: J30.9  ICD-9-CM: 477.9         Atypical chest pain ICD-10-CM: R07.89  ICD-9-CM: 786.59         IBS (irritable bowel syndrome) ICD-10-CM: K58.9  ICD-9-CM: 564.1         DJD (degenerative joint disease) ICD-10-CM: M19.90  ICD-9-CM: 715.90            Past Medical History:      has a past medical history of A-fib ([facility] Allergic rhinitis (3/27/2012), Angular blepharoconjunctivitis of both eyes (5/30/2012), Anxiety, Atypical chest pain (8/11/2011), Back pain, Cancer ([facility] Constipation, Dermatochalasis, DJD (degenerative joint disease) (8/11/2011), Hepatitis, History of CVA (cerebrovascular accident) (5/21/2019), HTN (hypertension) (8/11/2011), OTHER MEDICAL (2009), Hypothyroidism (8/11/2011), IBS (irritable bowel syndrome) (8/11/2011), Mitral regurgitation, Onychomycosis, Osteopenia, PAC (premature atrial contraction), Rectocele, Stress, Stroke ([facility] [address] Subdural hematoma (5/15/2020), Type 2 diabetes mellitus without complication ([facility] [address] Viral hepatitis, Weakness, and Zoster.     Past Surgical History:      has a past surgical history that includes hx other surgical; hx appendectomy; endoscopy, colon, diagnostic (2010); hx tonsillectomy; pr unlisted procedure breast; hx gyn; pr unlisted procedure vascular surgery; hx other surgical; and hx colonoscopy. Home Medications:     Prior to Admission medications    Medication Sig Start Date End Date Taking? Authorizing Provider   furosemide (Lasix) 20 mg tablet Take 2 Tablets by mouth Every morning. Yes Provider, Historical   glucosamine/chondr valerio A sod (glucosamine-chondroitin) 750-600 mg tab Take 2 Tablets by mouth daily. Yes Provider, Historical   melatonin 5 mg tablet Take 1 Tablet by mouth nightly. Yes Provider, Historical   rivaroxaban (Xarelto) 20 mg tab tablet Take 1 Tablet by mouth daily (with breakfast). Yes Provider, Historical   pravastatin (PRAVACHOL) 10 mg tablet Take 1 Tablet by mouth every other day. Yes Provider, Historical   metoprolol succinate (TOPROL-XL) 25 mg XL tablet Take 1 Tablet by mouth daily. Yes Provider, Historical   spironolactone (ALDACTONE) 25 mg tablet Take 2 Tablets by mouth daily (after lunch). Yes Provider, Historical   levothyroxine (SYNTHROID) 125 mcg tablet 1 tablet daily 11/4/22  Yes Essence Leiva IV, MD   [facility]) 5 mg tablet Take 1 Tablet by mouth two (2) times a day. 11/2/22  Yes Essence Leiva IV, MD   potassium chloride SR (Klor-Con 10) 10 mEq tablet Take 1 Tablet by mouth daily. 11/2/22  Yes Essence Leiva IV, MD   acetaminophen (TYLENOL) 325 mg tablet Take 1 Tablet by mouth nightly. Yes Provider, Historical   magnesium chloride (MAG DELAY) 64 mg delayed release tablet Take 2 Tablets by mouth daily. Yes Provider, Historical   polyethylene glycol (MIRALAX) 17 gram/dose powder Take 17 g by mouth daily as needed. Yes Provider, Historical   calcium carbonate/vitamin D3 (CALTRATE WITH VITAMIN D3 PO) Take 600 mg by mouth daily. Yes Provider, Historical   loratadine (CLARITIN) 10 mg tablet Take 1 Tablet by mouth daily.    Yes Provider, Historical   ascorbic acid (VITAMIN C) 500 mg tablet Take 1 Tablet by mouth daily. Yes Provider, Historical       Allergies/Social/Family History:      Allergies   Allergen Reactions    Augmentin [Amoxicillin-Pot Clavulanate] Nausea and Vomiting    Bactrim [Sulfamethoprim Ds] Other (comments)     Palpitations, diarrhea    Ciprofloxacin Unknown (comments)    Codeine Unknown (comments)    Hydrocodone Unknown (comments)    Levaquin [Levofloxacin] Rash    Melatonin Other (comments)     nightmares    Morphine Unknown (comments)    Pataday [Olopatadine] Other (comments)     Eye itching       Social History     Tobacco Use    Smoking status: Never    Smokeless tobacco: Never   Substance Use Topics    Alcohol use: No     Comment: no alcohol for 2 months, since afib diagnosis      Family History   Problem Relation Age of Onset    Diabetes Father     Heart Attack Father     Hypertension Father     Stroke Mother     Heart Attack Mother         CABG    Hypertension Mother     Breast Cancer Sister     Diabetes Sister     Hypertension Sister     No Known Problems Brother     No Known Problems Maternal Grandmother     No Known Problems Maternal Grandfather     No Known Problems Paternal Grandmother     No Known Problems Paternal Grandfather     No Known Problems Other     Cancer Son         bladder cancer    Diabetes Son     Diabetes Sister     Hypertension Sister     Diabetes Sister     Hypertension Sister     Heart Attack Sister        Review of Systems:     Limited to above    Objective:   Vital Signs:  Visit Vitals  /67   Pulse (!) 56   Temp 97.6 °F (36.4 °C)   Resp 19   Ht 5' 2\" (1.575 m)   Wt 77.1 kg (169 lb 15.6 oz)   LMP 1976   SpO2 94%   BMI 31.09 kg/m²    O2 Flow Rate (L/min): 2 l/min O2 Device: None (Room air) Temp (24hrs), Av.9 °F (36.6 °C), Min:97.5 °F (36.4 °C), Max:98.4 °F (36.9 °C)           Intake/Output:     Intake/Output Summary (Last 24 hours) at 2023 1807  Last data filed at 2023 1600  Gross per 24 hour   Intake 142.79 ml Output 850 ml   Net -707.21 ml       Physical Exam:   General Appearance: NAD, awake and alert follows commands  Head:  Normocephalic, without obvious abnormality, atraumatic  Lungs:   Respirations unlabored, CTA diminished per nursing  Heart:  Sinus bradycardia  Abdomen: Soft, NT/ND per nursing  Skin:  Skin color, texture, turgor normal, no rashes or lesions  Extremities: trace ankle edema  Neurologic:  CN 2-12 grossly intact    LABS AND  DATA: Personally reviewed  Recent Labs     05/01/23  0002 04/30/23  1844   WBC 6.5 7.1   HGB 14.2 13.8   HCT 43.9 42.4    286     Recent Labs     05/01/23  0002 04/30/23  1844    134*   K 4.7 5.0    101   CO2 29 30   BUN 22* 27*   CREA 0.99 1.21*   * 107*   CA 8.5 8.6   MG  --  2.0     Recent Labs     04/30/23  1844   AP 49   TP 6.4   ALB 3.2*   GLOB 3.2     No results for input(s): PHI, PCO2I, PO2I, FIO2I in the last 72 hours. No results for input(s): CPK, CKMB, TROIQ, BNPP in the last 72 hours. Hemodynamics:   PAP:   CO:     Wedge:   CI:     CVP:    SVR:       PVR:       Ventilator Settings:  Mode Rate Tidal Volume Pressure FiO2 PEEP                    Peak airway pressure:      Minute ventilation:          MEDS: Reviewed     I performed all aspects of the physical examination via Telemedicine associated with two way audio and video communication and with the on-site assistance of Nurse. Patient is critically ill in the ICU. I  personally  reviewed the pertinent medical records, laboratory/ pathology data and radiographic images. Due to  critical illness impairing one or more vital organs of this patient resulting in life threatening clinical situation  I have provided direct, frequent personal  assessment and manipulation in management plan and spent 35  minutes  of  critical care time excluding the time spent on procedures and teaching.   Greater than 50% of this time  in patient's care was  employed  in counseling and coordination of care and engaged in face to face discussion of case management issues, addressing questions, and outlining a plan of  therapy. Critical Care Time = 35 min. Total critical care time was excluding separately reportable procedures.     [id number] [facility]  5/1/2023

## 2023-05-01 NOTE — CONSULTS
[facility]  Cardiac Electrophysiology Note     [x]Initial Encounter     []Follow-up    Patient Name: Kobi Stiles - OZV:[id number] - O:[id number]  Primary Cardiologist: [facility] Physicians: Ole Leal (S)  Consulting Cardiologist: Mar Sweeney MD, [facility]       Reason for encounter:   Dyspnea and fatigue      HPI:  80-year-old woman with a history of atrial fibrillation, sick sinus syndrome mitral regurgitation, prior subdural hematoma, hypothyroidism presenting with dyspnea and fatigue. She reports fatigue and dyspnea for the past 2 weeks. Has been having increased lightheadedness. Noted to fall but unclear if there was any loss of consciousness. Upon presentation her heart rate was noted to be sinus bradycardia at 38 bpm.  She had elevated TSH at 8. Patient is followed by Dr. Terre Scheuermann. Per her report she came off of amiodarone about 3 weeks ago but has been on metoprolol succinate 25 mg daily last dose this weekend. She noted significant weakness and dizziness prior to hospitalization but no sterling syncope. Was monitored in ICU and was started on dopamine drip. SUBJECTIVE:  No chest pain, shortness of breath, palpitations     Assessment and Plan     Sick sinus syndrome/symptomatic bradycardia  Evidence of sick sinus syndrome with initial EKG demonstrated sinus bradycardia, first-degree AV block with WA of 240 ms. There has been no evidence of heart block. However she is on metoprolol at home and there is evidence of abnormal thyroid function with elevated TSH of 8. Given she has only come off of amiodarone 3 weeks ago, it is likely lingering within her system.   She was also taking metoprolol and there is some concern for hypothyroidism.  - Obtain echocardiogram next available  - Recommend hospitalist guidance regarding the need for up titration of Synthroid  - Hold/stop metoprolol, no longer on amiodarone  -Wean off dopamine drip as tolerated  - Monitor on telemetry to assess improvement in heart rate  - I explained that if she is able to wean off of dopamine which hemodynamic stability, we can hold off on pacemaker implantation  - However after 48 hours, if she remains bradycardic or dependent on dopamine, she would benefit from pacemaker implantation in the setting of sick sinus syndrome and symptomatic bradycardia  - Could consider pacemaker implantation on Wednesday    Paroxysmal atrial fibrillation  On Xarelto 20 mg daily, will need to reassess what she is actually taking at home  - Holding metoprolol and amiodarone    Hypothyroidism  Abnormal TSH at 8  - Defer to primary team, could benefit from consideration up titration of Synthroid    Dyspnea  Likely secondary to bradycardia. Chest x-ray without any evidence of volume overload. Hypotension  -Hold Norvasc, spironolactone, Lasix and metoprolol  - We will need to confirm what she is actually taking  - Wean off dopamine as tolerated for map goal of greater than 65          _________________________________  An Parmjit Black MD, [facility] [place], [address] [facility] [place]         ____________________________________________________________    Cardiac testing                  ECG:  Sinus bradycardia, rate 38 bpm, first-degree AV block with a FL of 240 ms  Review of Systems    [x]All other systems reviewed and all negative except as written in HPI    [] Patient unable to provide secondary to condition         Past Medical History:   Diagnosis Date    A-fib ([facility] [address]     Allergic rhinitis 3/27/2012    Angular blepharoconjunctivitis of both eyes 5/30/2012    Anxiety     Atypical chest pain 8/11/2011    Back pain     Cancer ([facility] [address]     skin carcinoma excision    Constipation     Dermatochalasis     DJD (degenerative joint disease) 8/11/2011    Hepatitis     History of CVA (cerebrovascular accident) 5/21/2019    May 2019 Head CT . Chronic Right Parietal CVA. HTN (hypertension) 8/11/2011    HX OTHER MEDICAL 2009    Cardiac cath negative , in [place]    Hypothyroidism 8/11/2011    IBS (irritable bowel syndrome) 8/11/2011    Mitral regurgitation     Onychomycosis     Osteopenia     PAC (premature atrial contraction)     Rectocele     Stress     Stroke ([facility] [address]     Subdural hematoma 5/15/2020    5/13/2020 GLF. Right Parietal SDH. eval'd by [facility]     Type 2 diabetes mellitus without complication ([facility] [address]     Viral hepatitis     Weakness     Zoster      Past Surgical History:   Procedure Laterality Date    ENDOSCOPY, COLON, DIAGNOSTIC  2010    diverticulosis, dr. Misael Sung, f/u     HX APPENDECTOMY      HX COLONOSCOPY      HX GYN      D and C, BTL, Hysterectomy('76)    HX OTHER SURGICAL      tonsilectomy    HX OTHER SURGICAL      eyelid sgy     HX TONSILLECTOMY      MA BREAST SURGERY PROCEDURE UNLISTED      rt breast bx neg. VASCULAR SURGERY PROCEDURE UNLIST      vein stripping     Social Hx:  reports that she has never smoked. She has never used smokeless tobacco. She reports that she does not drink alcohol and does not use drugs. Family Hx: family history includes Breast Cancer in her sister; Cancer in her son; Diabetes in her father, sister, sister, sister, and son; Heart Attack in her father, mother, and sister; Hypertension in her father, mother, sister, sister, and sister; No Known Problems in her brother, maternal grandfather, maternal grandmother, paternal grandfather, paternal grandmother, and another family member; Stroke in her mother.   Allergies   Allergen Reactions    Augmentin [Amoxicillin-Pot Clavulanate] Nausea and Vomiting    Bactrim [Sulfamethoprim Ds] Other (comments)     Palpitations, diarrhea    Ciprofloxacin Unknown (comments)    Codeine Unknown (comments)    Hydrocodone Unknown (comments)    Levaquin [Levofloxacin] Rash    Melatonin Other (comments)     nightmares    Morphine Unknown (comments)    Pataday [Olopatadine] Other (comments)     Eye itching           OBJECTIVE:  Wt Readings from Last 3 Encounters:   04/30/23 170 lb (77.1 kg)   12/12/22 170 lb (77.1 kg)   06/27/22 176 lb (79.8 kg)       Intake/Output Summary (Last 24 hours) at 5/1/2023 1052  Last data filed at 5/1/2023 0800  Gross per 24 hour   Intake 70.02 ml   Output 800 ml   Net -729.98 ml         Physical Exam    Vitals:   Vitals:    05/01/23 0945 05/01/23 1000 05/01/23 1015 05/01/23 1030   BP: 127/64 124/68 135/66 137/73   Pulse: (!) 55 (!) 55 (!) 57 (!) 55   Resp: 18 18 18 17   Temp:       SpO2: 93% 97% 98% 94%   Weight:       Height:         Telemetry: Sinus bradycardia, PVCs. PHYSICAL EXAM  General:  Alert and oriented, in no acute distress  Head:  Atraumatic, normocephalic  Eyes:  extraocular muscles intact  Neck:  Supple, normal range of motion  Lungs:  Clear to auscultation bilaterally, no wheezes/rales/rhonchi   Cardiovascular:  Regular rate and rhythm, normal S1-S2, no murmurs/rubs/gallops  Abdomen:  Soft, nontender, nondistended, normoactive bowel sounds  Skin:  Intact, no rash  Extremities:, no clubbing, cyanosis, or edema  Musculoskeletal: normal range of motion  Neurological:  Alert and oriented, no focal neurologic deficits  Psychiatric:  Normal mood and affect        Data Review:     Radiology:   XR Results (most recent):  Results from Hospital Encounter encounter on 04/30/23    XR ABD PORT  1 V    Narrative  ABDOMINAL RADIOGRAPHS. 5/1/2023 6:10 AM    INDICATION: Nausea. COMPARISON: 12/7/2011, CT abdomen pelvis 12/7/2011. TECHNIQUE: AP supine  view/s of the abdomen. FINDINGS: The bowel gas pattern is normal. Cholecystectomy clips in the right  upper quadrant. Mitral annular clips are partially imaged. Incidental note is  made of multiple round calcifications in the subcutaneous fat. Impression  Normal bowel gas pattern.     CT Results (most recent):  Results from [place] encounter on 04/30/23    CT HEAD WO CONT    Narrative  EXAM: CT HEAD WO CONT    INDICATION: fall prior to presentation    COMPARISON: MRI 6/15/2019. CT 4/30/2023. CONTRAST: None. TECHNIQUE: Unenhanced CT of the head was performed using 5 mm images. Brain and  bone windows were generated. Coronal and sagittal reformats. CT dose reduction  was achieved through use of a standardized protocol tailored for this  examination and automatic exposure control for dose modulation. FINDINGS:  No significant change in right parietal encephalomalacia or mild periventricular  white matter hypodensity. The ventricles and sulci are normal in size, shape and  configuration. There is no intracranial hemorrhage, extra-axial collection, or  mass effect. The basilar cisterns are open. No CT evidence of acute infarct. The bone windows demonstrate no abnormalities. The visualized portions of the  paranasal sinuses and mastoid air cells are clear. Impression  No acute findings. Completed right parietal infarct and mild  nonspecific white matter changes likely reflecting microvascular ischemic  change. MRI Results (most recent):  Results from [place] encounter on 06/15/19    MRI BRAIN W WO CONT    Narrative  EXAM:  MRI BRAIN W WO CONT    INDICATION:    vision disturbance. old CVA    COMPARISON:  None. CONTRAST: 14 ml Dotarem. TECHNIQUE:  Multiplanar multisequence acquisition without and with contrast of the brain. FINDINGS:  Diffusion imaging shows acute ischemic changes suspected in the posterior  thalamus on the left. Mild nonspecific white matter changes. Mild atrophy. Remote right posterior parietal ischemia. Flow voids in major vessels at the base of the brain are present. There is no extra-axial fluid collection, hemorrhage or shift. Special attention to the orbits show no masses or, fluid collection, lobes optic  nerves and extraocular muscles appear unremarkable.   Small bowel focal area of extradural enhancement left frontal is likely a small  meningioma measuring 6 x 3 mm, with no significant mass effect, clinical  significance. Stable. Impression  IMPRESSION:  Small acute left posterior basal ganglia ischemic infarct. Atrophy and white matter disease. Remote right posterior parietal infarct. Small incidental left frontal meningioma. No results for input(s): CPK, TROIQ in the last 72 hours. No lab exists for component: CKQMB, CPKMB, BMPP  Recent Labs     05/01/23  0002 04/30/23  1844    134*   K 4.7 5.0    101   CO2 29 30   BUN 22* 27*   CREA 0.99 1.21*   * 107*   CA 8.5 8.6     Recent Labs     05/01/23  0002 04/30/23  1844   WBC 6.5 7.1   HGB 14.2 13.8   HCT 43.9 42.4    286     Recent Labs     04/30/23  1844   PTP 14.0*   INR 1.4*   AP 49     No results for input(s): CHOL, LDLC in the last 72 hours.     No lab exists for component: TGL, HDLC,  HBA1C  Recent Labs     04/30/23  1844   TSH 8.06*           Current meds:    Current Facility-Administered Medications:     hydrALAZINE (APRESOLINE) 20 mg/mL injection 10 mg, 10 mg, IntraVENous, Q6H PRN, Cresencio Chua MD    levothyroxine (SYNTHROID) tablet 150 mcg, 150 mcg, Oral, 6am, Cristal Rosario MD, 150 mcg at 05/01/23 0538    sodium chloride (NS) flush 5-40 mL, 5-40 mL, IntraVENous, Q8H, Salazar, Uyen, DO, 10 mL at 04/30/23 2315    sodium chloride (NS) flush 5-40 mL, 5-40 mL, IntraVENous, PRN, Salazar, Uyen, DO    acetaminophen (TYLENOL) tablet 650 mg, 650 mg, Oral, Q6H PRN, 650 mg at 05/01/23 1008 **OR** acetaminophen (TYLENOL) suppository 650 mg, 650 mg, Rectal, Q6H PRN, Salazar, Uyen, DO    polyethylene glycol (MIRALAX) packet 17 g, 17 g, Oral, DAILY PRN, Salazar, Uyen, DO    ondansetron (ZOFRAN ODT) tablet 4 mg, 4 mg, Oral, Q8H PRN **OR** ondansetron (ZOFRAN) injection 4 mg, 4 mg, IntraVENous, Q6H PRN, Uyen Lincoln DO, 4 mg at 05/01/23 0018    DOPamine (INTROPIN) 800 mg in dextrose 5% 250 mL infusion, 0-10 mcg/kg/min, IntraVENous, TITRATE, Salazar, Pablo Noriega DO, Last Rate: 8.7 mL/hr at 05/01/23 7175, 6 mcg/kg/min at 05/01/23 5761    An Brittany Cassidy MD  [facility], [address] [address]  [facility]  [phone]      CC:Estefany Calixto MD

## 2023-05-01 NOTE — PROGRESS NOTES
Reason for Admission:  shortness of breath,symptomatic bradycardia                     RUR Score:        11%             Plan for utilizing home health:      to be determined    PCP: First and Last name:  Jayden Mi MD     Name of Practice:    Are you a current patient: Yes/No:    Approximate date of last visit:    Can you participate in a virtual visit with your PCP:                     Current Advanced Directive/Advance Care Plan: Full Code      Healthcare Decision Maker:           I have contacted the [address] [place] [address]                   Transition of Care Plan:                     Problems:  Sick sinus syndrome  Symptomatic bradycardia  Worsening SOB  Atrial fibrillation  History of subdural hematoma  History of fall prior to admission    Interventions:  Dopamine gtt  2 liters oxygen via nc  Echo  Stopping metoprolol      Plan:  Permanent pacemaker implantation if no improvement within 48 hours  As pt states the hospital does not have an updated advanced medical directive,I contacted the [address] [place] [address] where pt resides to see if they have an updated AMD.  As the [address] [place] [address] is independent living,the director informed me they do not receive copies of pt.'s advance medical directives unlike an assisted living where they do.  consult placed to remedy this problem for pt in relationship to her AMD.  I will update pt after PT/OT session.       Karin Mathew

## 2023-05-01 NOTE — CONSULTS
Consult Date: 5/1/2023    Consults bradycardia    Subjective   89F with h/o CVA (no significant deficits per patient), htn, Afib (on Xarelto), SSS, hypothyroidism, and anxiety who presented to ED with DURAND, easy fatigue, and lightheadedness. She was found to have symptomatic bradycardia and was admitted to the ICU on Dopamine gtt. Her HR responded to the gtt and bradycardia recurred with attempted titration thereof. She denied fever, chills, cough, chest pain, palpitations, or edema. She has had LH and nausea when HR drops below 40 (this was while at rest). Past Medical History:   Diagnosis Date    A-fib ([address]     Allergic rhinitis 3/27/2012    Angular blepharoconjunctivitis of both eyes 5/30/2012    Anxiety     Atypical chest pain 8/11/2011    Back pain     Cancer ([address]     skin carcinoma excision    Constipation     Dermatochalasis     DJD (degenerative joint disease) 8/11/2011    Hepatitis     History of CVA (cerebrovascular accident) 5/21/2019    May 2019 Head CT . Chronic Right Parietal CVA. HTN (hypertension) 8/11/2011    HX OTHER MEDICAL 2009    Cardiac cath negative , in [place]    Hypothyroidism 8/11/2011    IBS (irritable bowel syndrome) 8/11/2011    Mitral regurgitation     Onychomycosis     Osteopenia     PAC (premature atrial contraction)     Rectocele     Stress     Stroke ([address]     Subdural hematoma 5/15/2020    5/13/2020 GLF. Right Parietal SDH. eval'd by Nsgy     Type 2 diabetes mellitus without complication ([facility]     Viral hepatitis     Weakness     Zoster       Past Surgical History:   Procedure Laterality Date    ENDOSCOPY, COLON, DIAGNOSTIC  2010    diverticulosis, dr. Arlin Juan, f/u     HX APPENDECTOMY      HX COLONOSCOPY      HX GYN      D and C, BTL, Hysterectomy('76)    HX OTHER SURGICAL      tonsilectomy    HX OTHER SURGICAL      eyelid sgy     HX TONSILLECTOMY      [place] BREAST SURGERY PROCEDURE UNLISTED      rt breast bx neg.     VASCULAR SURGERY PROCEDURE UNLIST      vein stripping Family History   Problem Relation Age of Onset    Diabetes Father     Heart Attack Father     Hypertension Father     Stroke Mother     Heart Attack Mother         CABG    Hypertension Mother     Breast Cancer Sister     Diabetes Sister     Hypertension Sister     No Known Problems Brother     No Known Problems Maternal Grandmother     No Known Problems Maternal Grandfather     No Known Problems Paternal Grandmother     No Known Problems Paternal Grandfather     No Known Problems Other     Cancer Son         bladder cancer    Diabetes Son     Diabetes Sister     Hypertension Sister     Diabetes Sister     Hypertension Sister     Heart Attack Sister       Social History     Tobacco Use    Smoking status: Never    Smokeless tobacco: Never   Substance Use Topics    Alcohol use: No     Comment: no alcohol for 2 months, since afib diagnosis       Current Facility-Administered Medications   Medication Dose Route Frequency Provider Last Rate Last Admin    sodium chloride (NS) flush 5-40 mL  5-40 mL IntraVENous Q8H Inez Lincoln Debra, DO   10 mL at 04/30/23 2315    sodium chloride (NS) flush 5-40 mL  5-40 mL IntraVENous PRN Kehinde Scrivener, DO        acetaminophen (TYLENOL) tablet 650 mg  650 mg Oral Q6H PRN Kehinde Scrivener, DO   325 mg at 04/30/23 2311    Or    acetaminophen (TYLENOL) suppository 650 mg  650 mg Rectal Q6H PRN Kehinde Scrivener, DO        polyethylene glycol (MIRALAX) packet 17 g  17 g Oral DAILY PRN Kehinde Scrivener, DO        ondansetron (ZOFRAN ODT) tablet 4 mg  4 mg Oral Q8H PRN Kehinde Scrivener, DO        Or    ondansetron (ZOFRAN) injection 4 mg  4 mg IntraVENous Q6H PRN Kehinde Scrivener, DO   4 mg at 05/01/23 0018    DOPamine (INTROPIN) 800 mg in dextrose 5% 250 mL infusion  0-10 mcg/kg/min IntraVENous TITRATE Kehinde Scrivener, DO 7.2 mL/hr at 04/30/23 2310 5 mcg/kg/min at 04/30/23 2310        Review of Systems  Remainder of ros negative    Objective     Vital signs for last 24 hours:  Visit Vitals  BP (!) 163/71   Pulse 66   Temp 97.5 °F (36.4 °C)   Resp 15   Ht 5' 2\" (1.575 m)   Wt 77.1 kg (170 lb)   LMP 05/27/1976   SpO2 97%   BMI 31.09 kg/m²       Intake/Output this shift:  Current Shift: No intake/output data recorded. Last 3 Shifts: No intake/output data recorded. Exam facilitated by use of telemedicine platform and with assistance from in house team  Gen - Awake and interactive; no acute distress; full sentences  Head - nc/at  Eyes - anicteric sclera  Ent - normal external anatomy  Cvs - sinus bradycardia in 50s without external evidence of hypoperfusion  Pulm - normal WOB and adequate SaO2 on RA  GI-no evidence of tenderness with passive movement; no bruising or ecchymosis  Ext - no c/c/e  Msk - normal bulk  Neuro - oriented, no deficits in strength noted; no tremor; follows commands  Data Review:   Recent Results (from the past 24 hour(s))   SAMPLES BEING HELD    Collection Time: 04/30/23  6:44 PM   Result Value Ref Range    SAMPLES BEING HELD  1SST, 1RED     COMMENT        Add-on orders for these samples will be processed based on acceptable specimen integrity and analyte stability, which may vary by analyte. CBC WITH AUTOMATED DIFF    Collection Time: 04/30/23  6:44 PM   Result Value Ref Range    WBC 7.1 3.6 - 11.0 K/uL    RBC 4.55 3.80 - 5.20 M/uL    HGB 13.8 11.5 - 16.0 g/dL    HCT 42.4 35.0 - 47.0 %    MCV 93.2 80.0 - 99.0 FL    MCH 30.3 26.0 - 34.0 PG    MCHC 32.5 30.0 - 36.5 g/dL    RDW 13.8 11.5 - 14.5 %    PLATELET 177 009 - 600 K/uL    MPV 10.7 8.9 - 12.9 FL    NRBC 0.0 0  WBC    ABSOLUTE NRBC 0.00 0.00 - 0.01 K/uL    NEUTROPHILS 65 32 - 75 %    LYMPHOCYTES 19 12 - 49 %    MONOCYTES 13 5 - 13 %    EOSINOPHILS 2 0 - 7 %    BASOPHILS 1 0 - 1 %    IMMATURE GRANULOCYTES 0 0.0 - 0.5 %    ABS. NEUTROPHILS 4.7 1.8 - 8.0 K/UL    ABS. LYMPHOCYTES 1.3 0.8 - 3.5 K/UL    ABS. MONOCYTES 0.9 0.0 - 1.0 K/UL    ABS. EOSINOPHILS 0.1 0.0 - 0.4 K/UL    ABS. BASOPHILS 0.1 0.0 - 0.1 K/UL    ABS. IMM.  GRANS. 0.0 0.00 - 0.04 K/UL    DF AUTOMATED     METABOLIC PANEL, COMPREHENSIVE    Collection Time: 04/30/23  6:44 PM   Result Value Ref Range    Sodium 134 (L) 136 - 145 mmol/L    Potassium 5.0 3.5 - 5.1 mmol/L    Chloride 101 97 - 108 mmol/L    CO2 30 21 - 32 mmol/L    Anion gap 3 (L) 5 - 15 mmol/L    Glucose 107 (H) 65 - 100 mg/dL    BUN 27 (H) 6 - 20 MG/DL    Creatinine 1.21 (H) 0.55 - 1.02 MG/DL    BUN/Creatinine ratio 22 (H) 12 - 20      eGFR 43 (L) >60 ml/min/1.73m2    Calcium 8.6 8.5 - 10.1 MG/DL    Bilirubin, total 0.2 0.2 - 1.0 MG/DL    ALT (SGPT) 31 12 - 78 U/L    AST (SGOT) 27 15 - 37 U/L    Alk.  phosphatase 49 45 - 117 U/L    Protein, total 6.4 6.4 - 8.2 g/dL    Albumin 3.2 (L) 3.5 - 5.0 g/dL    Globulin 3.2 2.0 - 4.0 g/dL    A-G Ratio 1.0 (L) 1.1 - 2.2     PTT    Collection Time: 04/30/23  6:44 PM   Result Value Ref Range    aPTT 36.3 (H) 22.1 - 31.0 sec    aPTT, therapeutic range     58.0 - 77.0 SECS   PROTHROMBIN TIME + INR    Collection Time: 04/30/23  6:44 PM   Result Value Ref Range    INR 1.4 (H) 0.9 - 1.1      Prothrombin time 14.0 (H) 9.0 - 11.1 sec   TROPONIN-HIGH SENSITIVITY    Collection Time: 04/30/23  6:44 PM   Result Value Ref Range    Troponin-High Sensitivity 46 0 - 51 ng/L   NT-PRO BNP    Collection Time: 04/30/23  6:44 PM   Result Value Ref Range    NT pro-BNP 2,798 (H) <450 PG/ML   MAGNESIUM    Collection Time: 04/30/23  6:44 PM   Result Value Ref Range    Magnesium 2.0 1.6 - 2.4 mg/dL   TSH 3RD GENERATION    Collection Time: 04/30/23  6:44 PM   Result Value Ref Range    TSH 8.06 (H) 0.36 - 3.74 uIU/mL       Problem List:  Bradycardia  ADRIANA  Afib    Neuro - possible syncope versus pre-syncope related to bradycardia mist likely  -h/o cva without significant deficits  -?prior SDH but CT without evidence of hemorrhage; demonstrated chronic ischemic disease  -PT consult once HR stable    Cvs-with the bradycardia, holding amio for now and maintaining dopamine; mild ADRIANA but seems insufficient to account for bradycardia alone - although, possibly an added trigger  -No immediate need for trans venous pacing; cardiology following, echo pending, TSH/TFTs pending  -although has an elevated [id number] score, would clarify her fall risk/history as  this may impact risk:benefit for continued anticoagulation    Pulm-dyspnea on exertion may relate to bradycardia but she also has mild PVC on CXR; acceptable SaO2 and sx at present and with ADRIANA, will hold on further diuretic and reassess in am  -no evidence of pna    GI-no acute need for GWEN proph and benign exam    -ADRIANA by percent change in baseline and in keeping with her age and loss of muscle mass; hold diuretic and gentle fluids    Heme - ?on DOAC at baseline    ID-no compelling evidence of acute infection    Endo-continue Synthroid and adjust dose as needed (labs pending)    CCT 30minutes excluding teaching and procedures  I performed all aspects of the physical examination via Telemedicine associated with two way audio and video communication and with the on-site assistance of the bedside nurse. I am located in [place] and the patient is located in [facility]   The patient is critically ill in the ICU. I  personally  reviewed the pertinent medical records, laboratory/ pathology data and radiographic images. The decision making regarding this patient is as documented above, which was generated  following  discussion  with the multidisciplinary ICU team and creation of a treatment plan for  the patient. We discussed the patient's interval history and future coordination of care and  plans. The patient's medications  were reviewed and changes made as stipulated above. Due to  critical illness impairing one or more vital organs of this patient resulting in life threatening clinical situation  I have provided direct, frequent personal  assessment and manipulation in management plan.

## 2023-05-01 NOTE — PROGRESS NOTES
Problem: Self Care Deficits Care Plan (Adult)  Goal: *Acute Goals and Plan of Care (Insert Text)  Description: FUNCTIONAL STATUS PRIOR TO ADMISSION: Patient was modified independent using a rollator for functional mobility and independent/mod I for ADLs PTA. All meals are provided by the independent living facility where she resides. HOME SUPPORT: The patient lived alone at [facility]. Occupational Therapy Goals  Initiated 5/1/2023  1. Patient will perform grooming with modified independence standing at the sink >5 minutes within 7 day(s). 2.  Patient will perform upper body dressing and bathing with independence within 7 day(s). 3.  Patient will perform lower body dressing and bathing with minimal assistance using AE PRN within 7 day(s). 4.  Patient will perform toilet transfers with supervision/set-up within 7 day(s). 5.  Patient will perform all aspects of toileting with supervision/set-up within 7 day(s). 6.  Patient will participate in upper extremity therapeutic exercise/activities with independence for 10 minutes within 7 day(s). 7.  Patient will utilize energy conservation techniques during functional activities with verbal cues within 7 day(s). Outcome: Progressing Towards Goal   OCCUPATIONAL THERAPY EVALUATION  Patient: Luis Moseley (7 y.o. female)  Date: 5/1/2023  Primary Diagnosis: Dyspnea [R06.00]       Precautions: fall       ASSESSMENT  Based on the objective data described below, the patient presents with decreased activity tolerance, generalized weakness, impaired balance, and c/o fatigue following admission on 4/30/23 for SOB. Patient currently with low HR throughout on Dopamine gtt; Spoke with Cardiology NP Brandyn Saldivar) who provided clearance for activity as tolerated. Patient was received in bed alert, oriented x2 (unable to name hospital or date), and agreeable to all activity.   She performed bed mobility, a total of x5 sit to stand transfers, toilet transfer, and transfer to the recliner to remain OOB at end of tx with x1 person assist and use of RW. Patient engaged in ADLs with fair tolerance; Increased assistance needed for LB and standing ADLs. Education provided regarding safe transfer techniques, adaptive ADL techniques, and energy conservation techniques. Patient with low HR throughout tx however responded with appropriate increased with exertion. BP revealed delayed drop with standing activities but able to stabilize with seated rest.  RN aware and agreeable to monitor. Patient would benefit from continued skilled OT to progress towards goals and improve overall independence. Patient Vitals during OT eval:   Temp Pulse BP SpO2 (room air)   05/01/23 1315 Sitting; post activity (!) 59 123/67 97 %   05/01/23 1300 Post activity; completed BP reading in sitting but started in standing 64 108/66 95 %   05/01/23 1258 Standing 70 135/82 94 %   05/01/23 1252 Sitting 66 (!) 106/57 95 %   05/01/23 1245 Supine (!) 59 117/66 95 %          Current Level of Function Impacting Discharge (ADLs/self-care): Functional mobility, Min A;     Functional Outcome Measure: The patient scored 16/24 on the AM-PAC outcome measure. Patient will benefit from skilled therapy intervention to address the above noted impairments. PLAN :  Recommendations and Planned Interventions: self care training, functional mobility training, therapeutic exercise, balance training, therapeutic activities, endurance activities, patient education, home safety training, and family training/education    Frequency/Duration: Patient will be followed by occupational therapy 5 times a week to address goals.     Recommendation for discharge: (in order for the patient to meet his/her long term goals)  To be determined: Based on current functional status would recommend SNF vs. HH pending progress     This discharge recommendation:  Has been made in collaboration with the attending provider and/or case management    IF patient discharges home will need the following DME: none       SUBJECTIVE:   Patient agreeable to OT evaluation. OBJECTIVE DATA SUMMARY:   HISTORY:   Past Medical History:   Diagnosis Date    A-fib ([facility] [address]     Allergic rhinitis 3/27/2012    Angular blepharoconjunctivitis of both eyes 5/30/2012    Anxiety     Atypical chest pain 8/11/2011    Back pain     Cancer ([facility] [address]     skin carcinoma excision    Constipation     Dermatochalasis     DJD (degenerative joint disease) 8/11/2011    Hepatitis     History of CVA (cerebrovascular accident) 5/21/2019    May 2019 Head CT . Chronic Right Parietal CVA. HTN (hypertension) 8/11/2011    HX OTHER MEDICAL 2009    Cardiac cath negative , in [place]    Hypothyroidism 8/11/2011    IBS (irritable bowel syndrome) 8/11/2011    Mitral regurgitation     Onychomycosis     Osteopenia     PAC (premature atrial contraction)     Rectocele     Stress     Stroke ([facility] [address]     Subdural hematoma 5/15/2020    5/13/2020 GLF. Right Parietal SDH. eval'd by Nsgy     Type 2 diabetes mellitus without complication ([facility] [address]     Viral hepatitis     Weakness     Zoster      Past Surgical History:   Procedure Laterality Date    ENDOSCOPY, COLON, DIAGNOSTIC  2010    diverticulosis, dr. Alexander Ornelas, f/u     HX APPENDECTOMY      HX COLONOSCOPY      HX GYN      D and C, BTL, Hysterectomy('76)    HX OTHER SURGICAL      tonsilectomy    HX OTHER SURGICAL      eyelid sgy     HX TONSILLECTOMY      [place] BREAST SURGERY PROCEDURE UNLISTED      rt breast bx neg.     VASCULAR SURGERY PROCEDURE UNLIST      vein stripping       Expanded or extensive additional review of patient history:     Home Situation  Home Environment: Independent living ([employer] [place])  One/Two Story Residence: One story  Living Alone: Yes  Support Systems: Child(zander)  Patient Expects to be Discharged to[de-identified] Home with home health  Current DME Used/Available at Home: [address], rollator, [facility] chair, Carlos bars  Tub or Shower Type: Shower    Hand dominance: Right    EXAMINATION OF PERFORMANCE DEFICITS:  Cognitive/Behavioral Status:  Neurologic State: Alert;Eyes open spontaneously  Orientation Level: Oriented X4  Cognition: Follows commands; Appropriate for age attention/concentration  Perception: Appears intact  Perseveration: No perseveration noted  Safety/Judgement: Awareness of environment    Skin: Intact in the uppers    Edema: None noted in the uppers    Hearing: Auditory  Auditory Impairment: Hard of hearing, bilateral  Hearing Aids/Status: Bilateral, With patient    Vision/Perceptual:    Tracking: Able to track stimulus in all quadrants w/o difficulty    Diplopia: No    Acuity: Within Defined Limits       Range of Motion:  AROM: Within functional limits  PROM: Within functional limits    Strength:  Decreased but functional in the uppers    Coordination:  Fine Motor Skills-Upper: Left Intact; Right Intact    Gross Motor Skills-Upper: Left Intact; Right Intact    Tone & Sensation:  Tone: normal  Sensation: intact     Balance:  Sitting: Intact; High guard  Standing: Impaired; With support  Standing - Static: Fair  Standing - Dynamic : Fair    Functional Mobility and Transfers for ADLs:  Bed Mobility:  Rolling: Minimum assistance  Supine to Sit: Minimum assistance  Sit to Supine: Minimum assistance  Scooting: Minimum assistance    Transfers:  Sit to Stand: Minimum assistance  Stand to Sit: Minimum assistance  Bed to Chair: Minimum assistance  Toilet Transfer : Minimum assistance    ADL Assessment:  Feeding: Independent    Oral Facial Hygiene/Grooming: Setup    Bathing:  Moderate assistance     Upper Body Dressing: Setup    Lower Body Dressing: Maximum assistance    Toileting: Maximum assistance (min A for hygiene; max A for clothing managment)    Cognitive Retraining  Safety/Judgement: Awareness of environment    Functional Measure:  [facility] AM-PAC®      Daily Activity Inpatient Short Form (6-Clicks) Version 2  How much HELP from another person do you currently need. .. (If the patient hasn't done an activity recently, how much help from another person do you think they would need if they tried?) Total A Lot A Little None   1. Putting on and taking off regular lower body clothing? []   1 [x]   2 []   3 []   4   2. Bathing (including washing, rinsing, drying)? []   1 [x]   2 []   3 []   4   3. Toileting, which includes using toilet, bedpan, or urinal? []   1 []   2 []   3 []   4   4. Putting on and taking off regular upper body clothing? []   1 []   2 [x]   3 []   4   5. Taking care of personal grooming such as brushing teeth? []   1 []   2 [x]   3 []   4   6. Eating meals? []   1 []   2 []   3 [x]   4     Raw Score: 16/24                            Cutoff score ?191,2,3 had higher odds of discharging home with home health or need of SNF/IPR    1. Jessica Hunt. Validity of the AM-PAC 6-Clicks Inpatient Daily Activity and Basic Mobility Short Forms. Physical Therapy Mar 2014, [phone]; DOI: 10.2522/ptj.13856349  2. Red Elder. Association of AM-PAC \"6-Clicks\" Basic Mobility and Daily Activity Scores With Discharge Destination. Phys Ther. 2021 Apr 4;101(4):zlni514. doi: 10.1093/ptj/gnfy072. PMID: 99503571. V Wicho Taylor, Lilly D, Anel Turcios, Althea K, Yannick S. Activity Measure for Post-Acute Care \"6-Clicks\" Basic Mobility Scores Predict Discharge Destination After Acute Care Hospitalization in Select Patient Groups: A Retrospective, Observational Study. Arch Rehabil Res Clin Transl. 2022 Jul 16;4(3):222598. doi: 10.1016/j.arrct. 7481.576756. PMID: 66837205; PMCID: [id number]. 4. Mookie Ceron W, Lyndsey LEON. AM-PAC Short Forms Manual 4.0. Revised 2/2020.      Occupational Therapy Evaluation Charge Determination   History Examination Decision-Making   LOW Complexity : Brief history review  LOW Complexity : 1-3 performance deficits relating to physical, cognitive , or psychosocial skils that result in activity limitations and / or participation restrictions  LOW Complexity : No comorbidities that affect functional and no verbal or physical assistance needed to complete eval tasks       Based on the above components, the patient evaluation is determined to be of the following complexity level: LOW   Activity Tolerance:   Good    After treatment patient left in no apparent distress:    Sitting in chair, Call bell within reach, and Bed / chair alarm activated    COMMUNICATION/EDUCATION:   The patients plan of care was discussed with: Physical therapist, Registered nurse, and patient . Home safety education was provided and the patient/caregiver indicated understanding., Patient/family have participated as able in goal setting and plan of care. , and Patient/family agree to work toward stated goals and plan of care. This patients plan of care is appropriate for delegation to \[facility] [place] [facility]\"".     Thank you for this referral.  Ru Dee, OTR/L  Time Calculation: 35 mins

## 2023-05-01 NOTE — ED NOTES
TRANSFER - OUT REPORT:    Verbal report given to [facility] (name) on Benedict Doctor  being transferred to ICU 3 (unit) for routine progression of care       Report consisted of patients Situation, Background, Assessment and   Recommendations(SBAR). Information from the following report(s) SBAR, Kardex, ED Summary, Intake/Output, MAR, and Cardiac Rhythm    was reviewed with the receiving nurse. Lines:   Peripheral IV 04/30/23 Right Antecubital (Active)   Site Assessment Clean, dry, & intact 04/30/23 1901   Phlebitis Assessment 0 04/30/23 1901   Infiltration Assessment 0 04/30/23 1901   Dressing Status Clean, dry, & intact 04/30/23 1901   Dressing Type Transparent 04/30/23 1901   Hub Color/Line Status Pink 04/30/23 1901       Peripheral IV 04/30/23 Anterior;Distal;Right Forearm (Active)        Opportunity for questions and clarification was provided.       Patient transported with:   Registered Nurse

## 2023-05-01 NOTE — PROGRESS NOTES
[facility]: MED RECONCILIATION    Comments/Recommendations:   Patient alert and oriented for medication reconciliation. Patient states she manages her own medications. Medications added:     Metoprolol succinate     Medications removed:    Alprazolam  Amiodarone-stopped several weeks ago by outpatient MD  Amlodipine-stopped several weeks ago by outpatient MD     Medications adjusted:    Furosemide 40mg daily adjusted from 40mg AM and 20mg PM  Pravastatin 10mg every other day adjusted from daily  Spirolactone 50mg daily adjusted from 25mg daily     Information obtained from: patient, Rx query    Allergies: Augmentin [amoxicillin-pot clavulanate], Bactrim [sulfamethoprim ds], Ciprofloxacin, Codeine, Hydrocodone, Levaquin [levofloxacin], Melatonin, Morphine, and Pataday [olopatadine]    Prior to Admission Medications:     Medication Documentation Review Audit       Reviewed by Benito Lozano (Pharmacist) on 05/01/23 at 1420      Medication Sig Documenting Provider Last Dose Status Taking?   acetaminophen (TYLENOL) 325 mg tablet Take 1 Tablet by mouth nightly. Provider, Historical  Active Yes   amiodarone (CORDARONE) 200 mg tablet 1 tablet daily Salas Mancini IV, MD  Active Yes   amLODIPine (NORVASC) 5 mg tablet Take 1 Tablet by mouth daily. Provider, Historical  Active Yes   ascorbic acid (VITAMIN C) 500 mg tablet Take 1 Tablet by mouth daily. Provider, Historical  Active Yes   calcium carbonate/vitamin D3 (CALTRATE WITH VITAMIN D3 PO) Take 600 mg by mouth daily. Provider, Historical  Active Yes   furosemide (Lasix) 20 mg tablet Take 2 Tablets by mouth Every morning. Provider, Historical  Active Yes   glucosamine/chondr valerio A sod (glucosamine-chondroitin) 750-600 mg tab Take 2 Tablets by mouth daily. Provider, Historical  Active Yes   levothyroxine (SYNTHROID) 125 mcg tablet 1 tablet daily Salas Mancini IV, MD  Active Yes   loratadine (CLARITIN) 10 mg tablet Take 1 Tablet by mouth daily.  Provider, Historical  Active Yes   magnesium chloride (MAG DELAY) 64 mg delayed release tablet Take 2 Tablets by mouth daily. Provider, Historical  Active Yes   melatonin 5 mg tablet Take 1 Tablet by mouth nightly. Provider, Historical  Active Yes   memantine (NAMENDA) 5 mg tablet Take 1 Tablet by mouth two (2) times a day. Anita Styles IV, MD  Active Yes   metoprolol succinate (TOPROL-XL) 25 mg XL tablet Take 1 Tablet by mouth daily. Provider, Historical  Active Yes   polyethylene glycol (MIRALAX) 17 gram/dose powder Take 17 g by mouth daily as needed. Provider, Historical  Active Yes   potassium chloride SR (Klor-Con 10) 10 mEq tablet Take 1 Tablet by mouth daily. Anita Styles IV, MD  Active Yes   pravastatin (PRAVACHOL) 10 mg tablet Take 1 Tablet by mouth every other day. Provider, Historical  Active Yes   rivaroxaban (Xarelto) 20 mg tab tablet Take 1 Tablet by mouth daily (with breakfast). Provider, Historical  Active Yes   spironolactone (ALDACTONE) 25 mg tablet Take 2 Tablets by mouth daily (after lunch).  Provider, Historical  Active Yes                    Thank you,   Benito Solorio, PharmD, BCPS   Contact: [phone]

## 2023-05-01 NOTE — ED NOTES
MD made aware of pt vitals signs of heart rate 32 bpm and BP 86/46. Requested higher level of care for pt. MD advises upgrade to ICU orders will be placed. Primary RN notified.

## 2023-05-02 LAB
ANION GAP SERPL CALC-SCNC: 2 MMOL/L (ref 5–15)
BASOPHILS # BLD: 0 K/UL (ref 0–0.1)
BASOPHILS NFR BLD: 0 % (ref 0–1)
BUN SERPL-MCNC: 18 MG/DL (ref 6–20)
BUN/CREAT SERPL: 21 (ref 12–20)
CALCIUM SERPL-MCNC: 9 MG/DL (ref 8.5–10.1)
CHLORIDE SERPL-SCNC: 104 MMOL/L (ref 97–108)
CO2 SERPL-SCNC: 27 MMOL/L (ref 21–32)
CREAT SERPL-MCNC: 0.84 MG/DL (ref 0.55–1.02)
DIFFERENTIAL METHOD BLD: ABNORMAL
EOSINOPHIL # BLD: 0.1 K/UL (ref 0–0.4)
EOSINOPHIL NFR BLD: 1 % (ref 0–7)
ERYTHROCYTE [DISTWIDTH] IN BLOOD BY AUTOMATED COUNT: 13.2 % (ref 11.5–14.5)
GLUCOSE SERPL-MCNC: 138 MG/DL (ref 65–100)
HCT VFR BLD AUTO: 45.4 % (ref 35–47)
HGB BLD-MCNC: 14.9 G/DL (ref 11.5–16)
IMM GRANULOCYTES # BLD AUTO: 0 K/UL (ref 0–0.04)
IMM GRANULOCYTES NFR BLD AUTO: 0 % (ref 0–0.5)
LYMPHOCYTES # BLD: 1.2 K/UL (ref 0.8–3.5)
LYMPHOCYTES NFR BLD: 12 % (ref 12–49)
MCH RBC QN AUTO: 30.3 PG (ref 26–34)
MCHC RBC AUTO-ENTMCNC: 32.8 G/DL (ref 30–36.5)
MCV RBC AUTO: 92.5 FL (ref 80–99)
MONOCYTES # BLD: 1.1 K/UL (ref 0–1)
MONOCYTES NFR BLD: 11 % (ref 5–13)
NEUTS SEG # BLD: 7.4 K/UL (ref 1.8–8)
NEUTS SEG NFR BLD: 75 % (ref 32–75)
NRBC # BLD: 0 K/UL (ref 0–0.01)
NRBC BLD-RTO: 0 PER 100 WBC
PLATELET # BLD AUTO: 281 K/UL (ref 150–400)
PMV BLD AUTO: 10.3 FL (ref 8.9–12.9)
POTASSIUM SERPL-SCNC: 4.3 MMOL/L (ref 3.5–5.1)
RBC # BLD AUTO: 4.91 M/UL (ref 3.8–5.2)
SODIUM SERPL-SCNC: 133 MMOL/L (ref 136–145)
WBC # BLD AUTO: 9.9 K/UL (ref 3.6–11)

## 2023-05-02 PROCEDURE — 74011000250 HC RX REV CODE- 250: Performed by: INTERNAL MEDICINE

## 2023-05-02 PROCEDURE — 85025 COMPLETE CBC W/AUTO DIFF WBC: CPT

## 2023-05-02 PROCEDURE — 36415 COLL VENOUS BLD VENIPUNCTURE: CPT

## 2023-05-02 PROCEDURE — 74011250637 HC RX REV CODE- 250/637: Performed by: INTERNAL MEDICINE

## 2023-05-02 PROCEDURE — 65610000006 HC RM INTENSIVE CARE

## 2023-05-02 PROCEDURE — 74011250637 HC RX REV CODE- 250/637

## 2023-05-02 PROCEDURE — 51798 US URINE CAPACITY MEASURE: CPT

## 2023-05-02 PROCEDURE — 97110 THERAPEUTIC EXERCISES: CPT

## 2023-05-02 PROCEDURE — 80048 BASIC METABOLIC PNL TOTAL CA: CPT

## 2023-05-02 RX ORDER — LANOLIN ALCOHOL/MO/W.PET/CERES
3 CREAM (GRAM) TOPICAL ONCE
Status: COMPLETED | OUTPATIENT
Start: 2023-05-02 | End: 2023-05-02

## 2023-05-02 RX ADMIN — DOPAMINE HYDROCHLORIDE IN DEXTROSE 6 MCG/KG/MIN: 3.2 INJECTION, SOLUTION INTRAVENOUS at 04:30

## 2023-05-02 RX ADMIN — MEMANTINE 5 MG: 10 TABLET ORAL at 17:55

## 2023-05-02 RX ADMIN — MEMANTINE 5 MG: 10 TABLET ORAL at 08:15

## 2023-05-02 RX ADMIN — SODIUM CHLORIDE, PRESERVATIVE FREE 10 ML: 5 INJECTION INTRAVENOUS at 13:20

## 2023-05-02 RX ADMIN — OXYCODONE HYDROCHLORIDE AND ACETAMINOPHEN 500 MG: 500 TABLET ORAL at 08:15

## 2023-05-02 RX ADMIN — LEVOTHYROXINE SODIUM 175 MCG: 100 TABLET ORAL at 06:06

## 2023-05-02 RX ADMIN — Medication 3 MG: at 22:58

## 2023-05-02 RX ADMIN — ACETAMINOPHEN 650 MG: 325 TABLET ORAL at 22:58

## 2023-05-02 RX ADMIN — RIVAROXABAN 15 MG: 15 TABLET, FILM COATED ORAL at 08:15

## 2023-05-02 NOTE — PROGRESS NOTES
Problem: Pressure Injury - Risk of  Goal: *Prevention of pressure injury  Description: Document John Scale and appropriate interventions in the flowsheet. Outcome: Progressing Towards Goal  Note: Pressure Injury Interventions: Activity Interventions: Assess need for specialty bed, Increase time out of bed, Pressure redistribution bed/mattress(bed type), PT/OT evaluation    Mobility Interventions: Assess need for specialty bed, Pressure redistribution bed/mattress (bed type), HOB 30 degrees or less, PT/OT evaluation, Turn and reposition approx. every two hours(pillow and wedges)    Nutrition Interventions: Document food/fluid/supplement intake    Friction and Shear Interventions: Apply protective barrier, creams and emollients, Foam dressings/transparent film/skin sealants, HOB 30 degrees or less, Lift sheet, Lift team/patient mobility team, Minimize layers, Transferring/repositioning devices                Problem: Patient Education: Go to Patient Education Activity  Goal: Patient/Family Education  Outcome: Progressing Towards Goal     Problem: Falls - Risk of  Goal: *Absence of Falls  Description: Document Dania Fall Risk and appropriate interventions in the flowsheet.   Outcome: Progressing Towards Goal  Note: Fall Risk Interventions:                                Problem: Patient Education: Go to Patient Education Activity  Goal: Patient/Family Education  Outcome: Progressing Towards Goal     Problem: Patient Education: Go to Patient Education Activity  Goal: Patient/Family Education  Outcome: Progressing Towards Goal     Problem: Patient Education: Go to Patient Education Activity  Goal: Patient/Family Education  Outcome: Progressing Towards Goal

## 2023-05-02 NOTE — PROGRESS NOTES
1900-Bedside and Verbal shift change report given to Keisha Hernandez (oncoming nurse) by Mamadou Acosta and Javier Mathew (offgoing nurse). Report included the following information SBAR, Kardex, Intake/Output, MAR, Recent Results, Cardiac Rhythm sinus kourtney w/ 1st degree block, Alarm Parameters , and Quality Measures. See flowsheets for all assessments. See MAR for all medication administrations. 0700-Bedside and Verbal shift change report given to Javier Mathew (oncoming nurse) by Keisha Hernandez (offgoing nurse). Report included the following information SBAR, Kardex, Intake/Output, MAR, Recent Results, Cardiac Rhythm sinus kourtney w/ 1st degree block, Alarm Parameters , and Quality Measures.

## 2023-05-03 ENCOUNTER — APPOINTMENT (OUTPATIENT)
Dept: NON INVASIVE DIAGNOSTICS | Age: 88
DRG: 243 | End: 2023-05-03
Attending: NURSE PRACTITIONER
Payer: MEDICARE

## 2023-05-03 ENCOUNTER — APPOINTMENT (OUTPATIENT)
Dept: GENERAL RADIOLOGY | Age: 88
DRG: 243 | End: 2023-05-03
Attending: INTERNAL MEDICINE
Payer: MEDICARE

## 2023-05-03 LAB
ANION GAP SERPL CALC-SCNC: 1 MMOL/L (ref 5–15)
BASOPHILS # BLD: 0 K/UL (ref 0–0.1)
BASOPHILS NFR BLD: 0 % (ref 0–1)
BUN SERPL-MCNC: 17 MG/DL (ref 6–20)
BUN/CREAT SERPL: 21 (ref 12–20)
CALCIUM SERPL-MCNC: 8.7 MG/DL (ref 8.5–10.1)
CHLORIDE SERPL-SCNC: 105 MMOL/L (ref 97–108)
CO2 SERPL-SCNC: 28 MMOL/L (ref 21–32)
CREAT SERPL-MCNC: 0.8 MG/DL (ref 0.55–1.02)
DIFFERENTIAL METHOD BLD: ABNORMAL
EOSINOPHIL # BLD: 0.2 K/UL (ref 0–0.4)
EOSINOPHIL NFR BLD: 2 % (ref 0–7)
ERYTHROCYTE [DISTWIDTH] IN BLOOD BY AUTOMATED COUNT: 13.2 % (ref 11.5–14.5)
GLUCOSE SERPL-MCNC: 140 MG/DL (ref 65–100)
HCT VFR BLD AUTO: 44.3 % (ref 35–47)
HGB BLD-MCNC: 14.7 G/DL (ref 11.5–16)
IMM GRANULOCYTES # BLD AUTO: 0 K/UL (ref 0–0.04)
IMM GRANULOCYTES NFR BLD AUTO: 0 % (ref 0–0.5)
LYMPHOCYTES # BLD: 1.1 K/UL (ref 0.8–3.5)
LYMPHOCYTES NFR BLD: 11 % (ref 12–49)
MCH RBC QN AUTO: 30.4 PG (ref 26–34)
MCHC RBC AUTO-ENTMCNC: 33.2 G/DL (ref 30–36.5)
MCV RBC AUTO: 91.5 FL (ref 80–99)
MONOCYTES # BLD: 1 K/UL (ref 0–1)
MONOCYTES NFR BLD: 11 % (ref 5–13)
NEUTS SEG # BLD: 7.3 K/UL (ref 1.8–8)
NEUTS SEG NFR BLD: 76 % (ref 32–75)
NRBC # BLD: 0 K/UL (ref 0–0.01)
NRBC BLD-RTO: 0 PER 100 WBC
PLATELET # BLD AUTO: 318 K/UL (ref 150–400)
PMV BLD AUTO: 11.2 FL (ref 8.9–12.9)
POTASSIUM SERPL-SCNC: 4.9 MMOL/L (ref 3.5–5.1)
PROCALCITONIN SERPL-MCNC: <0.05 NG/ML
RBC # BLD AUTO: 4.84 M/UL (ref 3.8–5.2)
SODIUM SERPL-SCNC: 134 MMOL/L (ref 136–145)
T4 FREE SERPL-MCNC: 1.2 NG/DL (ref 0.8–1.5)
WBC # BLD AUTO: 9.7 K/UL (ref 3.6–11)

## 2023-05-03 PROCEDURE — 74011250636 HC RX REV CODE- 250/636: Performed by: STUDENT IN AN ORGANIZED HEALTH CARE EDUCATION/TRAINING PROGRAM

## 2023-05-03 PROCEDURE — 99152 MOD SED SAME PHYS/QHP 5/>YRS: CPT | Performed by: INTERNAL MEDICINE

## 2023-05-03 PROCEDURE — 93308 TTE F-UP OR LMTD: CPT | Performed by: STUDENT IN AN ORGANIZED HEALTH CARE EDUCATION/TRAINING PROGRAM

## 2023-05-03 PROCEDURE — 33208 INSRT HEART PM ATRIAL & VENT: CPT | Performed by: INTERNAL MEDICINE

## 2023-05-03 PROCEDURE — 02H63JZ INSERTION OF PACEMAKER LEAD INTO RIGHT ATRIUM, PERCUTANEOUS APPROACH: ICD-10-PCS | Performed by: INTERNAL MEDICINE

## 2023-05-03 PROCEDURE — 76937 US GUIDE VASCULAR ACCESS: CPT | Performed by: INTERNAL MEDICINE

## 2023-05-03 PROCEDURE — 2709999900 HC NON-CHARGEABLE SUPPLY

## 2023-05-03 PROCEDURE — 0JH606Z INSERTION OF PACEMAKER, DUAL CHAMBER INTO CHEST SUBCUTANEOUS TISSUE AND FASCIA, OPEN APPROACH: ICD-10-PCS | Performed by: INTERNAL MEDICINE

## 2023-05-03 PROCEDURE — 74011250637 HC RX REV CODE- 250/637: Performed by: INTERNAL MEDICINE

## 2023-05-03 PROCEDURE — 77030002934 HC SUT MCRYL J&J -B: Performed by: INTERNAL MEDICINE

## 2023-05-03 PROCEDURE — 74011000250 HC RX REV CODE- 250: Performed by: INTERNAL MEDICINE

## 2023-05-03 PROCEDURE — 97530 THERAPEUTIC ACTIVITIES: CPT

## 2023-05-03 PROCEDURE — C1898 LEAD, PMKR, OTHER THAN TRANS: HCPCS | Performed by: INTERNAL MEDICINE

## 2023-05-03 PROCEDURE — 77030002996 HC SUT SLK J&J -A: Performed by: INTERNAL MEDICINE

## 2023-05-03 PROCEDURE — 71045 X-RAY EXAM CHEST 1 VIEW: CPT

## 2023-05-03 PROCEDURE — 97535 SELF CARE MNGMENT TRAINING: CPT

## 2023-05-03 PROCEDURE — 77030041279 HC DRSG PRMSL AG MDII -B: Performed by: INTERNAL MEDICINE

## 2023-05-03 PROCEDURE — C1785 PMKR, DUAL, RATE-RESP: HCPCS | Performed by: INTERNAL MEDICINE

## 2023-05-03 PROCEDURE — 65610000006 HC RM INTENSIVE CARE

## 2023-05-03 PROCEDURE — 84145 PROCALCITONIN (PCT): CPT

## 2023-05-03 PROCEDURE — 85025 COMPLETE CBC W/AUTO DIFF WBC: CPT

## 2023-05-03 PROCEDURE — 36600 WITHDRAWAL OF ARTERIAL BLOOD: CPT

## 2023-05-03 PROCEDURE — C1769 GUIDE WIRE: HCPCS | Performed by: INTERNAL MEDICINE

## 2023-05-03 PROCEDURE — C1893 INTRO/SHEATH, FIXED,NON-PEEL: HCPCS | Performed by: INTERNAL MEDICINE

## 2023-05-03 PROCEDURE — 99233 SBSQ HOSP IP/OBS HIGH 50: CPT | Performed by: INTERNAL MEDICINE

## 2023-05-03 PROCEDURE — 93308 TTE F-UP OR LMTD: CPT

## 2023-05-03 PROCEDURE — 84439 ASSAY OF FREE THYROXINE: CPT

## 2023-05-03 PROCEDURE — 80048 BASIC METABOLIC PNL TOTAL CA: CPT

## 2023-05-03 PROCEDURE — 74011250636 HC RX REV CODE- 250/636: Performed by: INTERNAL MEDICINE

## 2023-05-03 PROCEDURE — 77030018729 HC ELECTRD DEFIB PAD CARD -B: Performed by: INTERNAL MEDICINE

## 2023-05-03 PROCEDURE — 77030033138 HC SUT PGA STRATFX J&J -B: Performed by: INTERNAL MEDICINE

## 2023-05-03 PROCEDURE — 02HK3JZ INSERTION OF PACEMAKER LEAD INTO RIGHT VENTRICLE, PERCUTANEOUS APPROACH: ICD-10-PCS | Performed by: INTERNAL MEDICINE

## 2023-05-03 PROCEDURE — 97116 GAIT TRAINING THERAPY: CPT

## 2023-05-03 PROCEDURE — 99153 MOD SED SAME PHYS/QHP EA: CPT | Performed by: INTERNAL MEDICINE

## 2023-05-03 PROCEDURE — 36415 COLL VENOUS BLD VENIPUNCTURE: CPT

## 2023-05-03 PROCEDURE — C1894 INTRO/SHEATH, NON-LASER: HCPCS | Performed by: INTERNAL MEDICINE

## 2023-05-03 DEVICE — LEAD 5076-58 MRI US RCMCRD
Type: IMPLANTABLE DEVICE | Status: FUNCTIONAL
Brand: CAPSUREFIX NOVUS MRI™ SURESCAN®

## 2023-05-03 DEVICE — IPG W1DR01 AZURE XT DR MRI USA
Type: IMPLANTABLE DEVICE | Status: FUNCTIONAL
Brand: AZURE™ XT DR MRI SURESCAN™

## 2023-05-03 RX ORDER — MIDAZOLAM HYDROCHLORIDE 1 MG/ML
INJECTION, SOLUTION INTRAMUSCULAR; INTRAVENOUS AS NEEDED
Status: DISCONTINUED | OUTPATIENT
Start: 2023-05-03 | End: 2023-05-03 | Stop reason: HOSPADM

## 2023-05-03 RX ORDER — LIDOCAINE HYDROCHLORIDE AND EPINEPHRINE 10; 10 MG/ML; UG/ML
INJECTION, SOLUTION INFILTRATION; PERINEURAL AS NEEDED
Status: DISCONTINUED | OUTPATIENT
Start: 2023-05-03 | End: 2023-05-03 | Stop reason: HOSPADM

## 2023-05-03 RX ORDER — CEFAZOLIN SODIUM 1 G/3ML
INJECTION, POWDER, FOR SOLUTION INTRAMUSCULAR; INTRAVENOUS AS NEEDED
Status: DISCONTINUED | OUTPATIENT
Start: 2023-05-03 | End: 2023-05-03 | Stop reason: HOSPADM

## 2023-05-03 RX ORDER — FENTANYL CITRATE 50 UG/ML
INJECTION, SOLUTION INTRAMUSCULAR; INTRAVENOUS AS NEEDED
Status: DISCONTINUED | OUTPATIENT
Start: 2023-05-03 | End: 2023-05-03 | Stop reason: HOSPADM

## 2023-05-03 RX ORDER — DIPHENHYDRAMINE HYDROCHLORIDE 50 MG/ML
INJECTION, SOLUTION INTRAMUSCULAR; INTRAVENOUS AS NEEDED
Status: DISCONTINUED | OUTPATIENT
Start: 2023-05-03 | End: 2023-05-03 | Stop reason: HOSPADM

## 2023-05-03 RX ORDER — HEPARIN SODIUM 200 [USP'U]/100ML
INJECTION, SOLUTION INTRAVENOUS
Status: COMPLETED | OUTPATIENT
Start: 2023-05-03 | End: 2023-05-03

## 2023-05-03 RX ORDER — ACETAMINOPHEN 325 MG/1
650 TABLET ORAL
Status: DISCONTINUED | OUTPATIENT
Start: 2023-05-03 | End: 2023-05-05 | Stop reason: HOSPADM

## 2023-05-03 RX ORDER — SODIUM CHLORIDE 0.9 % (FLUSH) 0.9 %
5-40 SYRINGE (ML) INJECTION AS NEEDED
Status: DISCONTINUED | OUTPATIENT
Start: 2023-05-03 | End: 2023-05-05 | Stop reason: HOSPADM

## 2023-05-03 RX ORDER — NALOXONE HYDROCHLORIDE 0.4 MG/ML
0.4 INJECTION, SOLUTION INTRAMUSCULAR; INTRAVENOUS; SUBCUTANEOUS AS NEEDED
Status: DISCONTINUED | OUTPATIENT
Start: 2023-05-03 | End: 2023-05-05 | Stop reason: HOSPADM

## 2023-05-03 RX ORDER — SODIUM CHLORIDE 0.9 % (FLUSH) 0.9 %
5-40 SYRINGE (ML) INJECTION EVERY 8 HOURS
Status: DISCONTINUED | OUTPATIENT
Start: 2023-05-03 | End: 2023-05-05 | Stop reason: HOSPADM

## 2023-05-03 RX ADMIN — SODIUM CHLORIDE, PRESERVATIVE FREE 10 ML: 5 INJECTION INTRAVENOUS at 06:03

## 2023-05-03 RX ADMIN — OXYCODONE HYDROCHLORIDE AND ACETAMINOPHEN 500 MG: 500 TABLET ORAL at 10:09

## 2023-05-03 RX ADMIN — SODIUM CHLORIDE, PRESERVATIVE FREE 10 ML: 5 INJECTION INTRAVENOUS at 22:24

## 2023-05-03 RX ADMIN — PHENYLEPHRINE HYDROCHLORIDE 30 MCG/MIN: 10 INJECTION INTRAVENOUS at 08:50

## 2023-05-03 RX ADMIN — ACETAMINOPHEN 650 MG: 325 TABLET ORAL at 22:22

## 2023-05-03 RX ADMIN — MEMANTINE 5 MG: 10 TABLET ORAL at 10:09

## 2023-05-03 RX ADMIN — SODIUM CHLORIDE, PRESERVATIVE FREE 10 ML: 5 INJECTION INTRAVENOUS at 00:13

## 2023-05-03 RX ADMIN — SODIUM CHLORIDE, PRESERVATIVE FREE 10 ML: 5 INJECTION INTRAVENOUS at 11:21

## 2023-05-03 RX ADMIN — PRAVASTATIN SODIUM 10 MG: 20 TABLET ORAL at 17:29

## 2023-05-03 RX ADMIN — PHENYLEPHRINE HYDROCHLORIDE 45 MCG/MIN: 10 INJECTION INTRAVENOUS at 10:55

## 2023-05-03 RX ADMIN — ACETAMINOPHEN 650 MG: 325 TABLET ORAL at 11:23

## 2023-05-03 RX ADMIN — MEMANTINE 5 MG: 10 TABLET ORAL at 17:29

## 2023-05-04 LAB
APPEARANCE UR: CLEAR
BACTERIA URNS QL MICRO: NEGATIVE /HPF
BILIRUB UR QL: NEGATIVE
COLOR UR: ABNORMAL
EPITH CASTS URNS QL MICRO: ABNORMAL /LPF
GLUCOSE UR STRIP.AUTO-MCNC: NEGATIVE MG/DL
HGB UR QL STRIP: NEGATIVE
HYALINE CASTS URNS QL MICRO: ABNORMAL /LPF (ref 0–2)
KETONES UR QL STRIP.AUTO: NEGATIVE MG/DL
LEUKOCYTE ESTERASE UR QL STRIP.AUTO: ABNORMAL
NITRITE UR QL STRIP.AUTO: NEGATIVE
PH UR STRIP: 5.5 (ref 5–8)
PROT UR STRIP-MCNC: NEGATIVE MG/DL
RBC #/AREA URNS HPF: ABNORMAL /HPF (ref 0–5)
SP GR UR REFRACTOMETRY: 1.02 (ref 1–1.03)
UA: UC IF INDICATED,UAUC: ABNORMAL
UROBILINOGEN UR QL STRIP.AUTO: 0.2 EU/DL (ref 0.2–1)
WBC URNS QL MICRO: ABNORMAL /HPF (ref 0–4)

## 2023-05-04 PROCEDURE — 74011000250 HC RX REV CODE- 250: Performed by: INTERNAL MEDICINE

## 2023-05-04 PROCEDURE — 81001 URINALYSIS AUTO W/SCOPE: CPT

## 2023-05-04 PROCEDURE — 65270000046 HC RM TELEMETRY

## 2023-05-04 PROCEDURE — 87086 URINE CULTURE/COLONY COUNT: CPT

## 2023-05-04 PROCEDURE — 97530 THERAPEUTIC ACTIVITIES: CPT

## 2023-05-04 PROCEDURE — 97116 GAIT TRAINING THERAPY: CPT

## 2023-05-04 PROCEDURE — 74011250636 HC RX REV CODE- 250/636: Performed by: INTERNAL MEDICINE

## 2023-05-04 PROCEDURE — 74011250637 HC RX REV CODE- 250/637: Performed by: INTERNAL MEDICINE

## 2023-05-04 RX ADMIN — SODIUM CHLORIDE, PRESERVATIVE FREE 10 ML: 5 INJECTION INTRAVENOUS at 06:52

## 2023-05-04 RX ADMIN — SODIUM CHLORIDE, PRESERVATIVE FREE 10 ML: 5 INJECTION INTRAVENOUS at 14:22

## 2023-05-04 RX ADMIN — LEVOTHYROXINE SODIUM 175 MCG: 100 TABLET ORAL at 06:52

## 2023-05-04 RX ADMIN — MEMANTINE 5 MG: 10 TABLET ORAL at 17:30

## 2023-05-04 RX ADMIN — SODIUM CHLORIDE, PRESERVATIVE FREE 10 ML: 5 INJECTION INTRAVENOUS at 21:11

## 2023-05-04 RX ADMIN — ACETAMINOPHEN 650 MG: 325 TABLET ORAL at 08:13

## 2023-05-04 RX ADMIN — ACETAMINOPHEN 650 MG: 325 TABLET ORAL at 21:10

## 2023-05-04 RX ADMIN — RIVAROXABAN 15 MG: 15 TABLET, FILM COATED ORAL at 08:52

## 2023-05-04 RX ADMIN — POLYETHYLENE GLYCOL 3350 17 G: 17 POWDER, FOR SOLUTION ORAL at 21:11

## 2023-05-04 RX ADMIN — PHENYLEPHRINE HYDROCHLORIDE 50 MCG/MIN: 10 INJECTION INTRAVENOUS at 00:19

## 2023-05-04 RX ADMIN — ACETAMINOPHEN 650 MG: 325 TABLET ORAL at 15:05

## 2023-05-04 RX ADMIN — SODIUM CHLORIDE, PRESERVATIVE FREE 10 ML: 5 INJECTION INTRAVENOUS at 14:10

## 2023-05-04 RX ADMIN — SODIUM CHLORIDE, PRESERVATIVE FREE 10 ML: 5 INJECTION INTRAVENOUS at 21:12

## 2023-05-04 RX ADMIN — OXYCODONE HYDROCHLORIDE AND ACETAMINOPHEN 500 MG: 500 TABLET ORAL at 08:08

## 2023-05-04 RX ADMIN — MEMANTINE 5 MG: 10 TABLET ORAL at 08:07

## 2023-05-05 VITALS
SYSTOLIC BLOOD PRESSURE: 121 MMHG | TEMPERATURE: 98.1 F | WEIGHT: 169.97 LBS | HEIGHT: 62 IN | DIASTOLIC BLOOD PRESSURE: 65 MMHG | OXYGEN SATURATION: 95 % | BODY MASS INDEX: 31.28 KG/M2 | HEART RATE: 61 BPM | RESPIRATION RATE: 18 BRPM

## 2023-05-05 LAB
ANION GAP SERPL CALC-SCNC: 1 MMOL/L (ref 5–15)
BACTERIA SPEC CULT: NORMAL
BASOPHILS # BLD: 0.1 K/UL (ref 0–0.1)
BASOPHILS NFR BLD: 1 % (ref 0–1)
BUN SERPL-MCNC: 17 MG/DL (ref 6–20)
BUN/CREAT SERPL: 30 (ref 12–20)
CALCIUM SERPL-MCNC: 7 MG/DL (ref 8.5–10.1)
CHLORIDE SERPL-SCNC: 109 MMOL/L (ref 97–108)
CO2 SERPL-SCNC: 26 MMOL/L (ref 21–32)
CREAT SERPL-MCNC: 0.57 MG/DL (ref 0.55–1.02)
DIFFERENTIAL METHOD BLD: NORMAL
EOSINOPHIL # BLD: 0.2 K/UL (ref 0–0.4)
EOSINOPHIL NFR BLD: 3 % (ref 0–7)
ERYTHROCYTE [DISTWIDTH] IN BLOOD BY AUTOMATED COUNT: 13.5 % (ref 11.5–14.5)
GLUCOSE SERPL-MCNC: 101 MG/DL (ref 65–100)
HCT VFR BLD AUTO: 36.1 % (ref 35–47)
HGB BLD-MCNC: 11.9 G/DL (ref 11.5–16)
IMM GRANULOCYTES # BLD AUTO: 0 K/UL (ref 0–0.04)
IMM GRANULOCYTES NFR BLD AUTO: 0 % (ref 0–0.5)
LYMPHOCYTES # BLD: 1.1 K/UL (ref 0.8–3.5)
LYMPHOCYTES NFR BLD: 18 % (ref 12–49)
MCH RBC QN AUTO: 30.4 PG (ref 26–34)
MCHC RBC AUTO-ENTMCNC: 33 G/DL (ref 30–36.5)
MCV RBC AUTO: 92.1 FL (ref 80–99)
MONOCYTES # BLD: 0.7 K/UL (ref 0–1)
MONOCYTES NFR BLD: 12 % (ref 5–13)
NEUTS SEG # BLD: 4 K/UL (ref 1.8–8)
NEUTS SEG NFR BLD: 66 % (ref 32–75)
NRBC # BLD: 0 K/UL (ref 0–0.01)
NRBC BLD-RTO: 0 PER 100 WBC
PLATELET # BLD AUTO: 200 K/UL (ref 150–400)
PMV BLD AUTO: 10.4 FL (ref 8.9–12.9)
POTASSIUM SERPL-SCNC: 3.9 MMOL/L (ref 3.5–5.1)
RBC # BLD AUTO: 3.92 M/UL (ref 3.8–5.2)
RBC MORPH BLD: NORMAL
SARS-COV-2 RDRP RESP QL NAA+PROBE: NOT DETECTED
SERVICE CMNT-IMP: NORMAL
SODIUM SERPL-SCNC: 136 MMOL/L (ref 136–145)
SOURCE, COVRS: NORMAL
WBC # BLD AUTO: 6.1 K/UL (ref 3.6–11)

## 2023-05-05 PROCEDURE — 74011000250 HC RX REV CODE- 250: Performed by: INTERNAL MEDICINE

## 2023-05-05 PROCEDURE — 97535 SELF CARE MNGMENT TRAINING: CPT

## 2023-05-05 PROCEDURE — 80048 BASIC METABOLIC PNL TOTAL CA: CPT

## 2023-05-05 PROCEDURE — 74011250637 HC RX REV CODE- 250/637: Performed by: INTERNAL MEDICINE

## 2023-05-05 PROCEDURE — 97530 THERAPEUTIC ACTIVITIES: CPT

## 2023-05-05 PROCEDURE — 36415 COLL VENOUS BLD VENIPUNCTURE: CPT

## 2023-05-05 PROCEDURE — 87635 SARS-COV-2 COVID-19 AMP PRB: CPT

## 2023-05-05 PROCEDURE — 85025 COMPLETE CBC W/AUTO DIFF WBC: CPT

## 2023-05-05 RX ADMIN — MEMANTINE 5 MG: 10 TABLET ORAL at 17:45

## 2023-05-05 RX ADMIN — LEVOTHYROXINE SODIUM 175 MCG: 100 TABLET ORAL at 05:33

## 2023-05-05 RX ADMIN — MEMANTINE 5 MG: 10 TABLET ORAL at 08:00

## 2023-05-05 RX ADMIN — RIVAROXABAN 15 MG: 15 TABLET, FILM COATED ORAL at 07:57

## 2023-05-05 RX ADMIN — SODIUM CHLORIDE, PRESERVATIVE FREE 10 ML: 5 INJECTION INTRAVENOUS at 05:34

## 2023-05-05 RX ADMIN — SODIUM CHLORIDE, PRESERVATIVE FREE 10 ML: 5 INJECTION INTRAVENOUS at 17:57

## 2023-05-05 RX ADMIN — OXYCODONE HYDROCHLORIDE AND ACETAMINOPHEN 500 MG: 500 TABLET ORAL at 08:00

## 2023-05-05 RX ADMIN — SODIUM CHLORIDE, PRESERVATIVE FREE 10 ML: 5 INJECTION INTRAVENOUS at 17:56

## 2023-05-05 RX ADMIN — PRAVASTATIN SODIUM 10 MG: 20 TABLET ORAL at 17:45

## 2023-05-06 RX ORDER — CHOLECALCIFEROL (VITAMIN D3) 125 MCG
1 CAPSULE ORAL NIGHTLY
COMMUNITY

## 2023-05-22 ENCOUNTER — TELEPHONE (OUTPATIENT)
Age: 88
End: 2023-05-22

## 2023-06-25 PROBLEM — D68.69 OTHER THROMBOPHILIA (HCC): Status: ACTIVE | Noted: 2023-06-25

## (undated) DEVICE — MICROPUNCTURE INTRODUCER SET SILHOUETTE TRANSITIONLESS WITH NITINOL WIRE GUIDE: Brand: MICROPUNCTURE

## (undated) DEVICE — 3M™ IOBAN™ 2 ANTIMICROBIAL INCISE DRAPE 6640EZ: Brand: IOBAN™ 2

## (undated) DEVICE — SUTURE MNFLMNT SH 26 MM 1/2 CI CRCLE TPR PNT SYMTRC PD PLU

## (undated) DEVICE — SUTURE ABSORBABLE MONOFILAMENT 2-0 CT1 12 IN UD MONOCRYL + SXMP1B412

## (undated) DEVICE — DRESSING POSTOP AG PRISMASEAL 3.5X6IN

## (undated) DEVICE — SUTURE STRATAFIX SPRL MCRYL + SZ 4-0 L12IN ABSRB UD PS-2 SXMP1B117

## (undated) DEVICE — REM POLYHESIVE ADULT PATIENT RETURN ELECTRODE: Brand: VALLEYLAB

## (undated) DEVICE — SUTURE PERMA-HAND SZ 0 L30IN NONABSORBABLE BLK L36MM CT-1 424H

## (undated) DEVICE — INTRO SHTH 7FR 13X20CM -- TEARAWAY

## (undated) DEVICE — GDWIRE ANG ULT-SEL 0.018INX80 -- NITREX - ORDER BY PK/3

## (undated) DEVICE — MEDI-TRACE CADENCE ADULT, DEFIBRILLATION ELECTRODE -RTS  (10 PR/PK) - PHYSIO-CONTROL: Brand: MEDI-TRACE CADENCE